# Patient Record
Sex: MALE | Race: WHITE | NOT HISPANIC OR LATINO | Employment: UNEMPLOYED | URBAN - METROPOLITAN AREA
[De-identification: names, ages, dates, MRNs, and addresses within clinical notes are randomized per-mention and may not be internally consistent; named-entity substitution may affect disease eponyms.]

---

## 2022-12-31 ENCOUNTER — HOSPITAL ENCOUNTER (INPATIENT)
Facility: HOSPITAL | Age: 57
LOS: 6 days | Discharge: HOME | DRG: 193 | End: 2023-01-06
Attending: EMERGENCY MEDICINE | Admitting: INTERNAL MEDICINE
Payer: MEDICARE

## 2022-12-31 ENCOUNTER — APPOINTMENT (OUTPATIENT)
Dept: GENERAL RADIOLOGY | Facility: HOSPITAL | Age: 57
DRG: 193 | End: 2022-12-31
Payer: MEDICARE

## 2022-12-31 DIAGNOSIS — J44.1 COPD EXACERBATION: ICD-10-CM

## 2022-12-31 DIAGNOSIS — J44.1 ACUTE EXACERBATION OF CHRONIC OBSTRUCTIVE PULMONARY DISEASE (COPD): Primary | ICD-10-CM

## 2022-12-31 DIAGNOSIS — R09.02 HYPOXIA: ICD-10-CM

## 2022-12-31 LAB
ALBUMIN SERPL-MCNC: 4 G/DL (ref 3.5–5.2)
ALBUMIN/GLOB SERPL: 1.3 G/DL
ALP SERPL-CCNC: 74 U/L (ref 39–117)
ALT SERPL W P-5'-P-CCNC: 15 U/L (ref 1–41)
ANION GAP SERPL CALCULATED.3IONS-SCNC: 7.2 MMOL/L (ref 5–15)
AST SERPL-CCNC: 17 U/L (ref 1–40)
BASE EXCESS BLDA CALC-SCNC: 3.3 MMOL/L (ref -2–2)
BASOPHILS # BLD AUTO: 0.12 10*3/MM3 (ref 0–0.2)
BASOPHILS NFR BLD AUTO: 1.3 % (ref 0–1.5)
BDY SITE: ABNORMAL
BILIRUB SERPL-MCNC: 0.5 MG/DL (ref 0–1.2)
BUN SERPL-MCNC: 9 MG/DL (ref 6–20)
BUN/CREAT SERPL: 10.6 (ref 7–25)
CALCIUM SPEC-SCNC: 9.3 MG/DL (ref 8.6–10.5)
CHLORIDE SERPL-SCNC: 98 MMOL/L (ref 98–107)
CO2 SERPL-SCNC: 30.8 MMOL/L (ref 22–29)
COHGB MFR BLD: 1.2 % (ref 0–1.5)
CREAT SERPL-MCNC: 0.85 MG/DL (ref 0.76–1.27)
DEPRECATED RDW RBC AUTO: 42.8 FL (ref 37–54)
EGFRCR SERPLBLD CKD-EPI 2021: 100.1 ML/MIN/1.73
EOSINOPHIL # BLD AUTO: 0.19 10*3/MM3 (ref 0–0.4)
EOSINOPHIL NFR BLD AUTO: 2 % (ref 0.3–6.2)
ERYTHROCYTE [DISTWIDTH] IN BLOOD BY AUTOMATED COUNT: 13.4 % (ref 12.3–15.4)
FHHB: 7.3 % (ref 0–5)
FLUAV AG NPH QL: NEGATIVE
FLUBV AG NPH QL IA: NEGATIVE
GAS FLOW AIRWAY: 1.5 LPM
GLOBULIN UR ELPH-MCNC: 3.1 GM/DL
GLUCOSE SERPL-MCNC: 101 MG/DL (ref 65–99)
HCO3 BLDA-SCNC: 30.2 MMOL/L (ref 22–26)
HCT VFR BLD AUTO: 48.2 % (ref 37.5–51)
HGB BLD-MCNC: 15.6 G/DL (ref 13–17.7)
HGB BLDA-MCNC: 16.2 G/DL (ref 13.8–16.4)
HOLD SPECIMEN: NORMAL
HOLD SPECIMEN: NORMAL
IMM GRANULOCYTES # BLD AUTO: 0.02 10*3/MM3 (ref 0–0.05)
IMM GRANULOCYTES NFR BLD AUTO: 0.2 % (ref 0–0.5)
LYMPHOCYTES # BLD AUTO: 0.98 10*3/MM3 (ref 0.7–3.1)
LYMPHOCYTES NFR BLD AUTO: 10.6 % (ref 19.6–45.3)
MCH RBC QN AUTO: 28.2 PG (ref 26.6–33)
MCHC RBC AUTO-ENTMCNC: 32.4 G/DL (ref 31.5–35.7)
MCV RBC AUTO: 87.2 FL (ref 79–97)
METHGB BLD QL: 0.3 % (ref 0–1.5)
MODALITY: ABNORMAL
MONOCYTES # BLD AUTO: 0.85 10*3/MM3 (ref 0.1–0.9)
MONOCYTES NFR BLD AUTO: 9.2 % (ref 5–12)
NEUTROPHILS NFR BLD AUTO: 7.11 10*3/MM3 (ref 1.7–7)
NEUTROPHILS NFR BLD AUTO: 76.7 % (ref 42.7–76)
NRBC BLD AUTO-RTO: 0 /100 WBC (ref 0–0.2)
NT-PROBNP SERPL-MCNC: 110.9 PG/ML (ref 0–900)
OXYHGB MFR BLDV: 91.2 % (ref 94–99)
PCO2 BLDA: 54.1 MM HG (ref 35–45)
PH BLDA: 7.36 PH UNITS (ref 7.35–7.45)
PLATELET # BLD AUTO: 190 10*3/MM3 (ref 140–450)
PMV BLD AUTO: 10.1 FL (ref 6–12)
PO2 BLDA: 65.3 MM HG (ref 80–100)
POTASSIUM SERPL-SCNC: 4.3 MMOL/L (ref 3.5–5.2)
PROT SERPL-MCNC: 7.1 G/DL (ref 6–8.5)
RBC # BLD AUTO: 5.53 10*6/MM3 (ref 4.14–5.8)
SAO2 % BLDCOA: 92.6 % (ref 95–99)
SARS-COV-2 RNA PNL SPEC NAA+PROBE: NOT DETECTED
SODIUM SERPL-SCNC: 136 MMOL/L (ref 136–145)
T4 FREE SERPL-MCNC: 0.95 NG/DL (ref 0.93–1.7)
TROPONIN T SERPL-MCNC: <0.01 NG/ML (ref 0–0.03)
TSH SERPL DL<=0.05 MIU/L-ACNC: 17.78 UIU/ML (ref 0.27–4.2)
WBC NRBC COR # BLD: 9.27 10*3/MM3 (ref 3.4–10.8)
WHOLE BLOOD HOLD COAG: NORMAL
WHOLE BLOOD HOLD SPECIMEN: NORMAL

## 2022-12-31 PROCEDURE — 94640 AIRWAY INHALATION TREATMENT: CPT

## 2022-12-31 PROCEDURE — 93005 ELECTROCARDIOGRAM TRACING: CPT

## 2022-12-31 PROCEDURE — 94799 UNLISTED PULMONARY SVC/PX: CPT

## 2022-12-31 PROCEDURE — 82375 ASSAY CARBOXYHB QUANT: CPT | Performed by: EMERGENCY MEDICINE

## 2022-12-31 PROCEDURE — 86738 MYCOPLASMA ANTIBODY: CPT | Performed by: INTERNAL MEDICINE

## 2022-12-31 PROCEDURE — 93010 ELECTROCARDIOGRAM REPORT: CPT | Performed by: SPECIALIST

## 2022-12-31 PROCEDURE — 36600 WITHDRAWAL OF ARTERIAL BLOOD: CPT | Performed by: EMERGENCY MEDICINE

## 2022-12-31 PROCEDURE — 87804 INFLUENZA ASSAY W/OPTIC: CPT | Performed by: EMERGENCY MEDICINE

## 2022-12-31 PROCEDURE — 83050 HGB METHEMOGLOBIN QUAN: CPT | Performed by: EMERGENCY MEDICINE

## 2022-12-31 PROCEDURE — 83880 ASSAY OF NATRIURETIC PEPTIDE: CPT | Performed by: EMERGENCY MEDICINE

## 2022-12-31 PROCEDURE — 84484 ASSAY OF TROPONIN QUANT: CPT | Performed by: EMERGENCY MEDICINE

## 2022-12-31 PROCEDURE — 93005 ELECTROCARDIOGRAM TRACING: CPT | Performed by: EMERGENCY MEDICINE

## 2022-12-31 PROCEDURE — 25010000002 METHYLPREDNISOLONE PER 125 MG: Performed by: EMERGENCY MEDICINE

## 2022-12-31 PROCEDURE — 71045 X-RAY EXAM CHEST 1 VIEW: CPT

## 2022-12-31 PROCEDURE — 99222 1ST HOSP IP/OBS MODERATE 55: CPT | Performed by: INTERNAL MEDICINE

## 2022-12-31 PROCEDURE — 99285 EMERGENCY DEPT VISIT HI MDM: CPT

## 2022-12-31 PROCEDURE — 86631 CHLAMYDIA ANTIBODY: CPT | Performed by: INTERNAL MEDICINE

## 2022-12-31 PROCEDURE — 84439 ASSAY OF FREE THYROXINE: CPT | Performed by: INTERNAL MEDICINE

## 2022-12-31 PROCEDURE — 25010000002 ENOXAPARIN PER 10 MG: Performed by: INTERNAL MEDICINE

## 2022-12-31 PROCEDURE — 80053 COMPREHEN METABOLIC PANEL: CPT | Performed by: EMERGENCY MEDICINE

## 2022-12-31 PROCEDURE — 86713 LEGIONELLA ANTIBODY: CPT | Performed by: INTERNAL MEDICINE

## 2022-12-31 PROCEDURE — 86603 ADENOVIRUS ANTIBODY: CPT | Performed by: INTERNAL MEDICINE

## 2022-12-31 PROCEDURE — 85025 COMPLETE CBC W/AUTO DIFF WBC: CPT | Performed by: EMERGENCY MEDICINE

## 2022-12-31 PROCEDURE — U0004 COV-19 TEST NON-CDC HGH THRU: HCPCS | Performed by: INTERNAL MEDICINE

## 2022-12-31 PROCEDURE — 84443 ASSAY THYROID STIM HORMONE: CPT | Performed by: INTERNAL MEDICINE

## 2022-12-31 PROCEDURE — 82805 BLOOD GASES W/O2 SATURATION: CPT | Performed by: EMERGENCY MEDICINE

## 2022-12-31 PROCEDURE — 94761 N-INVAS EAR/PLS OXIMETRY MLT: CPT

## 2022-12-31 RX ORDER — LEVOTHYROXINE SODIUM 0.1 MG/1
100 TABLET ORAL DAILY
COMMUNITY

## 2022-12-31 RX ORDER — BENZONATATE 100 MG/1
200 CAPSULE ORAL 3 TIMES DAILY PRN
Status: DISCONTINUED | OUTPATIENT
Start: 2022-12-31 | End: 2023-01-06 | Stop reason: HOSPADM

## 2022-12-31 RX ORDER — METHYLPREDNISOLONE SODIUM SUCCINATE 40 MG/ML
40 INJECTION, POWDER, LYOPHILIZED, FOR SOLUTION INTRAMUSCULAR; INTRAVENOUS 2 TIMES DAILY
Status: DISCONTINUED | OUTPATIENT
Start: 2022-12-31 | End: 2022-12-31

## 2022-12-31 RX ORDER — DIAZEPAM 5 MG/1
5 TABLET ORAL ONCE
Status: COMPLETED | OUTPATIENT
Start: 2022-12-31 | End: 2022-12-31

## 2022-12-31 RX ORDER — PANTOPRAZOLE SODIUM 40 MG/1
40 TABLET, DELAYED RELEASE ORAL
Status: DISCONTINUED | OUTPATIENT
Start: 2022-12-31 | End: 2023-01-06 | Stop reason: HOSPADM

## 2022-12-31 RX ORDER — LIDOCAINE HYDROCHLORIDE 20 MG/ML
15 SOLUTION OROPHARYNGEAL ONCE
Status: COMPLETED | OUTPATIENT
Start: 2022-12-31 | End: 2022-12-31

## 2022-12-31 RX ORDER — ONDANSETRON 2 MG/ML
4 INJECTION INTRAMUSCULAR; INTRAVENOUS EVERY 6 HOURS PRN
Status: DISCONTINUED | OUTPATIENT
Start: 2022-12-31 | End: 2023-01-06 | Stop reason: HOSPADM

## 2022-12-31 RX ORDER — DOXYCYCLINE 100 MG/1
100 CAPSULE ORAL EVERY 12 HOURS SCHEDULED
Status: COMPLETED | OUTPATIENT
Start: 2022-12-31 | End: 2023-01-05

## 2022-12-31 RX ORDER — FLUTICASONE FUROATE AND VILANTEROL 100; 25 UG/1; UG/1
1 POWDER RESPIRATORY (INHALATION)
COMMUNITY
End: 2023-01-06 | Stop reason: HOSPADM

## 2022-12-31 RX ORDER — METHYLPREDNISOLONE SODIUM SUCCINATE 40 MG/ML
40 INJECTION, POWDER, LYOPHILIZED, FOR SOLUTION INTRAMUSCULAR; INTRAVENOUS 2 TIMES DAILY
Status: DISCONTINUED | OUTPATIENT
Start: 2023-01-01 | End: 2023-01-01

## 2022-12-31 RX ORDER — CHOLECALCIFEROL (VITAMIN D3) 125 MCG
10 CAPSULE ORAL NIGHTLY
Status: DISCONTINUED | OUTPATIENT
Start: 2022-12-31 | End: 2023-01-06 | Stop reason: HOSPADM

## 2022-12-31 RX ORDER — ALUMINA, MAGNESIA, AND SIMETHICONE 2400; 2400; 240 MG/30ML; MG/30ML; MG/30ML
15 SUSPENSION ORAL ONCE
Status: COMPLETED | OUTPATIENT
Start: 2022-12-31 | End: 2022-12-31

## 2022-12-31 RX ORDER — ENOXAPARIN SODIUM 100 MG/ML
40 INJECTION SUBCUTANEOUS EVERY 24 HOURS
Status: DISCONTINUED | OUTPATIENT
Start: 2022-12-31 | End: 2023-01-06 | Stop reason: HOSPADM

## 2022-12-31 RX ORDER — ALBUTEROL SULFATE 2.5 MG/3ML
5 SOLUTION RESPIRATORY (INHALATION) ONCE
Status: COMPLETED | OUTPATIENT
Start: 2022-12-31 | End: 2022-12-31

## 2022-12-31 RX ORDER — LISINOPRIL 20 MG/1
20 TABLET ORAL DAILY
COMMUNITY

## 2022-12-31 RX ORDER — METHYLPREDNISOLONE SODIUM SUCCINATE 125 MG/2ML
125 INJECTION, POWDER, LYOPHILIZED, FOR SOLUTION INTRAMUSCULAR; INTRAVENOUS ONCE
Status: COMPLETED | OUTPATIENT
Start: 2022-12-31 | End: 2022-12-31

## 2022-12-31 RX ORDER — GUAIFENESIN 600 MG/1
1200 TABLET, EXTENDED RELEASE ORAL EVERY 12 HOURS SCHEDULED
Status: DISCONTINUED | OUTPATIENT
Start: 2022-12-31 | End: 2023-01-06 | Stop reason: HOSPADM

## 2022-12-31 RX ORDER — IPRATROPIUM BROMIDE AND ALBUTEROL SULFATE 2.5; .5 MG/3ML; MG/3ML
3 SOLUTION RESPIRATORY (INHALATION)
Status: DISCONTINUED | OUTPATIENT
Start: 2022-12-31 | End: 2023-01-02

## 2022-12-31 RX ORDER — ALBUTEROL SULFATE 2.5 MG/3ML
2.5 SOLUTION RESPIRATORY (INHALATION) EVERY 4 HOURS PRN
Status: DISCONTINUED | OUTPATIENT
Start: 2022-12-31 | End: 2023-01-06 | Stop reason: HOSPADM

## 2022-12-31 RX ORDER — SODIUM CHLORIDE 0.9 % (FLUSH) 0.9 %
10 SYRINGE (ML) INJECTION AS NEEDED
Status: DISCONTINUED | OUTPATIENT
Start: 2022-12-31 | End: 2023-01-06 | Stop reason: HOSPADM

## 2022-12-31 RX ORDER — IPRATROPIUM BROMIDE AND ALBUTEROL SULFATE 2.5; .5 MG/3ML; MG/3ML
3 SOLUTION RESPIRATORY (INHALATION) ONCE
Status: COMPLETED | OUTPATIENT
Start: 2022-12-31 | End: 2022-12-31

## 2022-12-31 RX ORDER — HYDROXYZINE HYDROCHLORIDE 25 MG/1
25 TABLET, FILM COATED ORAL EVERY 6 HOURS PRN
Status: DISCONTINUED | OUTPATIENT
Start: 2022-12-31 | End: 2023-01-06 | Stop reason: HOSPADM

## 2022-12-31 RX ADMIN — METHYLPREDNISOLONE SODIUM SUCCINATE 125 MG: 125 INJECTION, POWDER, FOR SOLUTION INTRAMUSCULAR; INTRAVENOUS at 14:40

## 2022-12-31 RX ADMIN — DIAZEPAM 5 MG: 5 TABLET ORAL at 18:16

## 2022-12-31 RX ADMIN — ENOXAPARIN SODIUM 40 MG: 100 INJECTION SUBCUTANEOUS at 19:09

## 2022-12-31 RX ADMIN — PANTOPRAZOLE SODIUM 40 MG: 40 TABLET, DELAYED RELEASE ORAL at 18:16

## 2022-12-31 RX ADMIN — Medication 10 MG: at 22:22

## 2022-12-31 RX ADMIN — GUAIFENESIN 1200 MG: 600 TABLET ORAL at 22:22

## 2022-12-31 RX ADMIN — ALUMINUM HYDROXIDE, MAGNESIUM HYDROXIDE, AND DIMETHICONE 15 ML: 400; 400; 40 SUSPENSION ORAL at 14:45

## 2022-12-31 RX ADMIN — DOXYCYCLINE 100 MG: 100 CAPSULE ORAL at 22:22

## 2022-12-31 RX ADMIN — ALBUTEROL SULFATE 5 MG: 2.5 SOLUTION RESPIRATORY (INHALATION) at 15:58

## 2022-12-31 RX ADMIN — IPRATROPIUM BROMIDE AND ALBUTEROL SULFATE 3 ML: .5; 3 SOLUTION RESPIRATORY (INHALATION) at 14:37

## 2022-12-31 RX ADMIN — LIDOCAINE HYDROCHLORIDE 15 ML: 20 SOLUTION ORAL at 14:45

## 2022-12-31 RX ADMIN — IPRATROPIUM BROMIDE AND ALBUTEROL SULFATE 3 ML: 2.5; .5 SOLUTION RESPIRATORY (INHALATION) at 20:38

## 2022-12-31 NOTE — H&P
[facility]  [facility]   Patient Name: Delvis Boston  : 1965  MRN: [id number]  Primary Care Physician:  Margie Garcia APRN  Date of admission: 2022    Subjective   Subjective     Chief Complaint: Short of breath    HPI:    Delvis Boston is a 57 y.o. male with past medical history difficult for COPD who presents emergency department with a 1 week history of shortness of breath and cough productive of a greenish sputum.  Denies any fever or chills.  Reports he has been using his nebulizer treatments as outpatient without any significant improvement in his symptoms.  Because of this he presented to the emergency department on today and the DVT the patient was found to be hypoxic with O2 sats of 87% on room air.  The patient does not currently use supplemental O2.  Received nebulizer therapy and IV Solu-Medrol.  Supplemental O2 was removed and patient continued to be hypoxic with O2 sats in the upper 80s and subsequently has been admitted to the hospital service for further evaluation and treatment.    Personal History     Past Medical History:  COPD  Hypothyroidism  Hypertension  Recurrent spontaneous pneumothoraces    Past Surgical History:  Right lung surgery    Family History:   Reviewed noncontributory    Social History:   Patient denies tobacco EtOH or illicit drug use     Home Medications:   Reviewed    Allergies:  Not on File    Review of Systems   All systems were reviewed and negative except for: As noted in HPI.  Patient also complains of burning epigastric pain and nausea.  He denies any chest pain.    Objective   Objective     Vitals:   Temp:  [97.8 °F (36.6 °C)] 97.8 °F (36.6 °C)  Heart Rate:  [84-94] 89  Resp:  [17-32] 20  BP: (128-148)/(63-99) 145/81  Flow (L/min):  [1.5-3] 3    Physical Exam    Constitutional: Awake, alert, no acute distress   Eyes: Pupils equal, sclerae anicteric, no conjunctival injection   HENT: NCAT, mucous  membranes moist   Neck: Supple, no thyromegaly, no lymphadenopathy, trachea midline   Respiratory: Diminished breath sounds in all lung fields very faint expiratory wheezes bilaterally, nonlabored respirations    Cardiovascular: RRR, no murmurs, palpable pedal pulses bilaterally   Gastrointestinal: Positive bowel sounds, soft, nontender, nondistended   Musculoskeletal: No bilateral ankle edema, no clubbing or cyanosis to extremities   Psychiatric: Appropriate affect, cooperative   Neurologic: Oriented x 3, in all extremities equally no focal weakness appreciated, Cranial Nerves grossly intact, speech clear   Skin: No rashes     Result Review    Result Review:  I have personally reviewed the results from the time of this admission to 12/31/2022 16:36 EST and agree with these findings:  [x]  Laboratory  []  Microbiology  [x]  Radiology  [x]  EKG/Telemetry   []  Cardiology/Vascular   []  Pathology  [x]  Old records  []  Other:      Assessment & Plan   Assessment / Plan     Assessment/Plan:   • Acute COPD exacerbation with acute bronchitis  • Acute hypoxic respiratory failure  • Hypothyroidism      Admit to the hospital service to telemetry monitor bed.  Continue nebulizer treatment with albuterol Atrovent nebs scheduled and albuterol nebs as needed.  Continue supplemental O2 to maintain sats greater equal to 89%.  Continue Solu-Medrol 40 mg IV every 12 hours.  Initiate doxycycline 100 mg p.o. twice daily.  Initiate mucolytic therapies and cough meds  Check respiratory panel  Check sputum culture  Patient does not follow with a pulmonologist currently as an outpatient, will consult RT  to make these arrangements.    DVT prophylaxis:  No DVT prophylaxis order currently exists.    CODE STATUS:    Code Status (Patient has no pulse and is not breathing): CPR (Attempt to Resuscitate)  Medical Interventions (Patient has pulse or is breathing): Full Support      Admission Status:  I believe this patient meets  inpatient status.    Electronically signed by Messi Painter MD, 12/31/22, 4:36 PM EST.

## 2022-12-31 NOTE — ED PROVIDER NOTES
"Time: 1:12 PM EST  Date of encounter:  12/31/2022  Independent Historian/Clinical History and Information was obtained by:   Patient  Chief Complaint: SOB    History is limited by: N/A    History of Present Illness:  Patient is a 57 y.o. year old male who presents to the emergency department for evaluation of SOB. Pt notes SOB is worsening over the last 5 days. Pt notes he has had a cough with discolored phlem for the past 5 days. Pt notes Hx of COPD. Pt denies smoking but reports dipping. Pt also states he has stomach pain where his ulcers are.         History provided by:  Patient   used: No        Patient Care Team  Primary Care Provider: Margie Garcia APRN    Past Medical History:     Not on File  No past medical history on file.  No past surgical history on file.  No family history on file.    Home Medications:  Prior to Admission medications    Not on File        Social History:          Review of Systems:  Review of Systems   Constitutional: Negative for chills and fever.   HENT: Negative for congestion, rhinorrhea and sore throat.    Eyes: Negative for pain and visual disturbance.   Respiratory: Positive for cough and shortness of breath. Negative for apnea and chest tightness.    Cardiovascular: Negative for chest pain and palpitations.   Gastrointestinal: Negative for abdominal pain, diarrhea, nausea and vomiting.   Genitourinary: Negative for difficulty urinating and dysuria.   Musculoskeletal: Negative for joint swelling and myalgias.   Skin: Negative for color change.   Neurological: Negative for seizures and headaches.   Psychiatric/Behavioral: Negative.    All other systems reviewed and are negative.       Physical Exam:  /81   Pulse 92   Temp 97.8 °F (36.6 °C) (Oral)   Resp 20   Ht 170.2 cm (67\")   Wt 90.3 kg (199 lb)   SpO2 92%   BMI 31.17 kg/m²     Physical Exam  Vitals and nursing note reviewed.   Constitutional:       General: He is not in acute distress.     " Appearance: Normal appearance. He is not toxic-appearing.   HENT:      Head: Normocephalic and atraumatic.      Jaw: There is normal jaw occlusion.   Eyes:      General: Lids are normal.      Extraocular Movements: Extraocular movements intact.      Conjunctiva/sclera: Conjunctivae normal.      Pupils: Pupils are equal, round, and reactive to light.   Cardiovascular:      Rate and Rhythm: Normal rate and regular rhythm.      Pulses: Normal pulses.      Heart sounds: Normal heart sounds.   Pulmonary:      Effort: Pulmonary effort is normal. Prolonged expiration present. No respiratory distress.      Breath sounds: Wheezing (All lung fields) present. No rhonchi.   Abdominal:      General: Abdomen is flat.      Palpations: Abdomen is soft.      Tenderness: There is no abdominal tenderness. There is no guarding or rebound.   Musculoskeletal:         General: Normal range of motion.      Cervical back: Normal range of motion and neck supple.      Right lower leg: No edema.      Left lower leg: No edema.   Skin:     General: Skin is warm and dry.   Neurological:      Mental Status: He is alert and oriented to person, place, and time. Mental status is at baseline.   Psychiatric:         Mood and Affect: Mood normal.                  Procedures:  Procedures      Medical Decision Making:      Comorbidities that affect care:    COPD    External Notes reviewed:    None      The following orders were placed and all results were independently analyzed by me:  Orders Placed This Encounter   Procedures   • Respiratory Culture - Sputum, Cough   • COVID PRE-OP / PRE-PROCEDURE SCREENING ORDER (NO ISOLATION) - Swab, Nasopharynx   • COVID-19,APTIMA PANTHER([place]),[facility], NP/OP SWAB IN UTM/VTM/SALINE TRANSPORT MEDIA,24 HR TAT - Swab, Nasopharynx   • Influenza Antigen, Rapid - Swab, Nasopharynx   • XR Chest 1 View   • [place] Draw   • Comprehensive Metabolic Panel   • BNP   • Troponin   • CBC Auto Differential   • Blood Gas,  Arterial -With Co-Ox Panel: Yes   • Basic Metabolic Panel   • CBC Auto Differential   • Magnesium   • Phosphorus   • Respiratory Infection Panel A   • NPO Diet NPO Type: Strict NPO   • Undress & Gown   • Cardiac Monitoring   • Continuous Pulse Oximetry   • Vital Signs   • Reason for COPD Admission: New Oxygen Requirements   • Tobacco Cessation Education   • Respiratory Treatment Education (MDI / Spacer / Nebulizer)   • COPD Education   • Cough / Deep Breathe   • Tobacco Cessation Education   • Code Status and Medical Interventions:   • Inpatient Hospitalist Consult   • Document Pulse Oximetry - On Room Air / Home O2 Level   • Incentive Spirometry   • Oxygen Therapy- Nasal Cannula; Titrate for SPO2: Greater Than or Equal To, 88%   • ECG 12 Lead ED Triage Standing Order; SOA   • Insert Peripheral IV   • Inpatient Admission   • CBC & Differential   • Green Top (Gel)   • Lavender Top   • Gold Top - SST   • Light Blue Top       Medications Given in the Emergency Department:  Medications   sodium chloride 0.9 % flush 10 mL (has no administration in time range)   albuterol (PROVENTIL) nebulizer solution 0.083% 2.5 mg/3mL (has no administration in time range)   ipratropium-albuterol (DUO-NEB) nebulizer solution 3 mL (has no administration in time range)   benzonatate (TESSALON) capsule 200 mg (has no administration in time range)   guaiFENesin (MUCINEX) 12 hr tablet 1,200 mg (has no administration in time range)   diazePAM (VALIUM) tablet 5 mg (has no administration in time range)   pantoprazole (PROTONIX) EC tablet 40 mg (has no administration in time range)   methylPREDNISolone sodium succinate (SOLU-Medrol) injection 40 mg (has no administration in time range)   doxycycline (MONODOX) capsule 100 mg (has no administration in time range)   ondansetron (ZOFRAN) injection 4 mg (has no administration in time range)   HYDROcod Polst-CPM Polst ER (TUSSIONEX PENNKINETIC) 10-8 MG/5ML ER suspension 5 mL (has no administration in  time range)   hydrOXYzine (ATARAX) tablet 25 mg (has no administration in time range)   melatonin tablet 10 mg (has no administration in time range)   methylPREDNISolone sodium succinate (SOLU-Medrol) injection 125 mg (125 mg Intravenous Given 12/31/22 1440)   aluminum-magnesium hydroxide-simethicone (MAALOX MAX) 400-400-40 MG/5ML suspension 15 mL (15 mL Oral Given 12/31/22 1445)   Lidocaine Viscous HCl (XYLOCAINE) 2 % solution 15 mL (15 mL Mouth/Throat Given 12/31/22 1445)   ipratropium-albuterol (DUO-NEB) nebulizer solution 3 mL (3 mL Nebulization Given 12/31/22 1437)   albuterol (PROVENTIL) nebulizer solution 0.083% 2.5 mg/3mL (5 mg Nebulization Given 12/31/22 1558)        ED Course:         Labs:    Lab Results (last 24 hours)     Procedure Component Value Units Date/Time    CBC & Differential [[id number]]  (Abnormal) Collected: 12/31/22 1235    Specimen: Blood Updated: 12/31/22 1243    Narrative:      The following orders were created for panel order CBC & Differential.  Procedure                               Abnormality         Status                     ---------                               -----------         ------                     CBC Auto Differential[[id number]]        Abnormal            Final result                 Please view results for these tests on the individual orders.    Comprehensive Metabolic Panel [[id number]]  (Abnormal) Collected: 12/31/22 1235    Specimen: Blood Updated: 12/31/22 1301     Glucose 101 mg/dL      BUN 9 mg/dL      Creatinine 0.85 mg/dL      Sodium 136 mmol/L      Potassium 4.3 mmol/L      Chloride 98 mmol/L      CO2 30.8 mmol/L      Calcium 9.3 mg/dL      Total Protein 7.1 g/dL      Albumin 4.0 g/dL      ALT (SGPT) 15 U/L      AST (SGOT) 17 U/L      Alkaline Phosphatase 74 U/L      Total Bilirubin 0.5 mg/dL      Globulin 3.1 gm/dL      A/G Ratio 1.3 g/dL      BUN/Creatinine Ratio 10.6     Anion Gap 7.2 mmol/L      eGFR 100.1 mL/min/1.73      Comment: National Kidney  Foundation and American Society of Nephrology (ASN) Task Force recommended calculation based on the Chronic Kidney Disease Epidemiology Collaboration (CKD-EPI) equation refit without adjustment for race.       Narrative:      GFR Normal >60  Chronic Kidney Disease <60  Kidney Failure <15      BNP [[id number]]  (Normal) Collected: 12/31/22 1235    Specimen: Blood Updated: 12/31/22 1259     proBNP 110.9 pg/mL     Narrative:      Among patients with dyspnea, NT-proBNP is highly sensitive for the detection of acute congestive heart failure. In addition NT-proBNP of <300 pg/ml effectively rules out acute congestive heart failure with 99% negative predictive value.      Troponin [[id number]]  (Normal) Collected: 12/31/22 1235    Specimen: Blood Updated: 12/31/22 1301     Troponin T <0.010 ng/mL     Narrative:      Troponin T Reference Range:  <= 0.03 ng/mL-   Negative for AMI  >0.03 ng/mL-     Abnormal for myocardial necrosis.  Clinicians would have to utilize clinical acumen, EKG, Troponin and serial changes to determine if it is an Acute Myocardial Infarction or myocardial injury due to an underlying chronic condition.       Results may be falsely decreased if patient taking Biotin.      CBC Auto Differential [[id number]]  (Abnormal) Collected: 12/31/22 1235    Specimen: Blood Updated: 12/31/22 1243     WBC 9.27 10*3/mm3      RBC 5.53 10*6/mm3      Hemoglobin 15.6 g/dL      Hematocrit 48.2 %      MCV 87.2 fL      MCH 28.2 pg      MCHC 32.4 g/dL      RDW 13.4 %      RDW-SD 42.8 fl      MPV 10.1 fL      Platelets 190 10*3/mm3      Neutrophil % 76.7 %      Lymphocyte % 10.6 %      Monocyte % 9.2 %      Eosinophil % 2.0 %      Basophil % 1.3 %      Immature Grans % 0.2 %      Neutrophils, Absolute 7.11 10*3/mm3      Lymphocytes, Absolute 0.98 10*3/mm3      Monocytes, Absolute 0.85 10*3/mm3      Eosinophils, Absolute 0.19 10*3/mm3      Basophils, Absolute 0.12 10*3/mm3      Immature Grans, Absolute 0.02 10*3/mm3      nRBC 0.0  /100 WBC     Respiratory Infection Panel A [[id number]] Collected: 12/31/22 1235    Specimen: Blood Updated: 12/31/22 1638    Blood Gas, Arterial -With Co-Ox Panel: Yes [[id number]]  (Abnormal) Collected: 12/31/22 1438    Specimen: Arterial Blood Updated: 12/31/22 1500     pH, Arterial 7.364 pH units      pCO2, Arterial 54.1 mm Hg      pO2, Arterial 65.3 mm Hg      HCO3, Arterial 30.2 mmol/L      Base Excess, Arterial 3.3 mmol/L      O2 Saturation, Arterial 92.6 %      Hemoglobin, Blood Gas 16.2 g/dL      Carboxyhemoglobin 1.2 %      Methemoglobin 0.30 %      Oxyhemoglobin 91.2 %      FHHB 7.3 %      Site Arterial: right brachial     Modality Cannula     Flow Rate 1.5 lpm     COVID PRE-OP / PRE-PROCEDURE SCREENING ORDER (NO ISOLATION) - Swab, Nasopharynx [[id number]] Collected: 12/31/22 1733    Specimen: Swab from Nasopharynx Updated: 12/31/22 1739    Narrative:      The following orders were created for panel order COVID PRE-OP / PRE-PROCEDURE SCREENING ORDER (NO ISOLATION) - Swab, Nasopharynx.  Procedure                               Abnormality         Status                     ---------                               -----------         ------                     COVID-19,APTIMA PANTHER(...[[id number]]                      In process                   Please view results for these tests on the individual orders.    COVID-19,APTIMA PANTHER([place]), [facility]/ [facility], NP/OP SWAB IN UTM/VTM/SALINE TRANSPORT MEDIA,24 HR TAT - Swab, Nasopharynx [[id number]] Collected: 12/31/22 1733    Specimen: Swab from Nasopharynx Updated: 12/31/22 1739    Influenza Antigen, Rapid - Swab, Nasopharynx [[id number]] Collected: 12/31/22 1733    Specimen: Swab from Nasopharynx Updated: 12/31/22 1741           Imaging:    XR Chest 1 View    Result Date: 12/31/2022  PROCEDURE: XR CHEST 1 VW  COMPARISON: [facility], CR, CHEST PA/AP & LAT 2V, 9/18/2015, 11:54.  INDICATIONS: SOA Triage Protocol  FINDINGS:  Severe emphysematous changes are  noted.  Postoperative changes are noted in the mid to upper right lung field.  Coarse interstitial markings in the mid and lower lung fields bilaterally are suspected to represent chronic interstitial lung disease.  No acute infiltrate or effusion is seen.  The cardiac and mediastinal silhouettes appear normal.        1. A severe emphysematous changes 2. Probable scarring/fibrosis in the mid and lower lung fields bilaterally. 3. No acute consolidation identified.       Alok Humphrey M.D.       Electronically Signed and Approved By: Alok Humphrey M.D. on 12/31/2022 at 13:14                 Differential Diagnosis and Discussion:    Dyspnea: Differential diagnosis includes but is not limited to metabolic acidosis, neurological disorders, psychogenic, asthma, pneumothorax, upper airway obstruction, COPD, pneumonia, noncardiogenic pulmonary edema, interstitial lung disease, anemia, congestive heart failure, and pulmonary embolism    All labs were reviewed and analyzed by me.  All X-rays were independently reviewed by me.    MDM  Number of Diagnoses or Management Options  Acute exacerbation of chronic obstructive pulmonary disease (COPD) (HCC)  Diagnosis management comments: In summary this is a 57-year-old male with a history of COPD that is normally not oxygen requiring who presents to the emergency department with complaints of shortness of breath.  On arrival he is mildly hypoxic and did require nasal cannula oxygen supplementation.  On examination he was wheezing significantly with a prolonged expiratory phase and minimal air movement as a result.  Chest x-ray unremarkable.  CBC independently reviewed by me and shows no critical abnormalities.  CMP independently reviewed by me and shows no critical abnormalities.  Patient was given Solu-Medrol, breathing treatments with minimal improvement.  Attempted to wean him off of the nasal cannula oxygen however became hypoxic at rest as low as 87% when doing so.  He was  therefore placed back on nasal cannula oxygen.  Patient will be admitted to the hospital for further treatment of since acute exacerbation of COPD.  ABG was performed and does not reveal hypercapnia but does show hypoxia.  Patient case has been discussed with the hospitalist team who will admit to the hospital for further evaluation and continuation of treatment.    Patient Progress  Patient progress: stable (Pt was informed of all test results and all questions answered)           Patient Care Considerations:    I considered ordering a CT scan of the chest, however Chest x-ray was clear      Consultants/Shared Management Plan:    Hospitalist: I have discussed the case with Admitting physician who agrees to accept the patient for admission.    Social Determinants of Health:    Patient is independent, reliable, and has access to care.       Disposition and Care Coordination:    Admit:   Through independent evaluation of the patient's history, physical, and imperical data, the patient meets criteria for observation/admission to the hospital.        Final diagnoses:   Acute exacerbation of chronic obstructive pulmonary disease (COPD) (HCC)   Hypoxia        ED Disposition     ED Disposition   Decision to Admit    Condition   --    Comment   Level of Care: Remote Telemetry [26]   Diagnosis: Acute exacerbation of chronic obstructive pulmonary disease (COPD) (HCC) [541762]   Certification: I Certify That Inpatient Hospital Services Are Medically Necessary For Greater Than 2 Midnights               This medical record created using voice recognition software.      Documentation assistance provided by Andrea leavitt, acting as scribe for Marshall Hutton MD. Information recorded by the scribe was done at my direction and has been verified and validated by me.     Andrea Leavitt  12/31/22 1340       Andrea Leavitt  12/31/22 [id number]       Marshall Hutton MD  12/31/22 2541

## 2023-01-01 ENCOUNTER — APPOINTMENT (OUTPATIENT)
Dept: CT IMAGING | Facility: HOSPITAL | Age: 58
DRG: 193 | End: 2023-01-01
Payer: MEDICARE

## 2023-01-01 LAB
ANION GAP SERPL CALCULATED.3IONS-SCNC: 11 MMOL/L (ref 5–15)
BASOPHILS # BLD AUTO: 0.01 10*3/MM3 (ref 0–0.2)
BASOPHILS NFR BLD AUTO: 0.1 % (ref 0–1.5)
BUN SERPL-MCNC: 11 MG/DL (ref 6–20)
BUN/CREAT SERPL: 13.3 (ref 7–25)
CALCIUM SPEC-SCNC: 9.6 MG/DL (ref 8.6–10.5)
CHLORIDE SERPL-SCNC: 98 MMOL/L (ref 98–107)
CO2 SERPL-SCNC: 27 MMOL/L (ref 22–29)
CREAT SERPL-MCNC: 0.83 MG/DL (ref 0.76–1.27)
DEPRECATED RDW RBC AUTO: 43.3 FL (ref 37–54)
EGFRCR SERPLBLD CKD-EPI 2021: 100.8 ML/MIN/1.73
EOSINOPHIL # BLD AUTO: 0 10*3/MM3 (ref 0–0.4)
EOSINOPHIL NFR BLD AUTO: 0 % (ref 0.3–6.2)
ERYTHROCYTE [DISTWIDTH] IN BLOOD BY AUTOMATED COUNT: 13.2 % (ref 12.3–15.4)
GLUCOSE SERPL-MCNC: 184 MG/DL (ref 65–99)
HCT VFR BLD AUTO: 50.3 % (ref 37.5–51)
HGB BLD-MCNC: 16.1 G/DL (ref 13–17.7)
IMM GRANULOCYTES # BLD AUTO: 0.05 10*3/MM3 (ref 0–0.05)
IMM GRANULOCYTES NFR BLD AUTO: 0.6 % (ref 0–0.5)
L PNEUMO1 AG UR QL IA: NEGATIVE
LYMPHOCYTES # BLD AUTO: 0.7 10*3/MM3 (ref 0.7–3.1)
LYMPHOCYTES NFR BLD AUTO: 8.6 % (ref 19.6–45.3)
M PNEUMO IGM SER QL: NEGATIVE
MAGNESIUM SERPL-MCNC: 2.3 MG/DL (ref 1.6–2.6)
MCH RBC QN AUTO: 28.6 PG (ref 26.6–33)
MCHC RBC AUTO-ENTMCNC: 32 G/DL (ref 31.5–35.7)
MCV RBC AUTO: 89.5 FL (ref 79–97)
MONOCYTES # BLD AUTO: 0.18 10*3/MM3 (ref 0.1–0.9)
MONOCYTES NFR BLD AUTO: 2.2 % (ref 5–12)
NEUTROPHILS NFR BLD AUTO: 7.19 10*3/MM3 (ref 1.7–7)
NEUTROPHILS NFR BLD AUTO: 88.5 % (ref 42.7–76)
NRBC BLD AUTO-RTO: 0 /100 WBC (ref 0–0.2)
PHOSPHATE SERPL-MCNC: 3.8 MG/DL (ref 2.5–4.5)
PLATELET # BLD AUTO: 191 10*3/MM3 (ref 140–450)
PMV BLD AUTO: 10 FL (ref 6–12)
POTASSIUM SERPL-SCNC: 5.2 MMOL/L (ref 3.5–5.2)
QT INTERVAL: 373 MS
RBC # BLD AUTO: 5.62 10*6/MM3 (ref 4.14–5.8)
S PNEUM AG SPEC QL LA: NEGATIVE
SODIUM SERPL-SCNC: 136 MMOL/L (ref 136–145)
WBC NRBC COR # BLD: 8.13 10*3/MM3 (ref 3.4–10.8)

## 2023-01-01 PROCEDURE — 99223 1ST HOSP IP/OBS HIGH 75: CPT | Performed by: INTERNAL MEDICINE

## 2023-01-01 PROCEDURE — 80048 BASIC METABOLIC PNL TOTAL CA: CPT | Performed by: INTERNAL MEDICINE

## 2023-01-01 PROCEDURE — 84100 ASSAY OF PHOSPHORUS: CPT | Performed by: INTERNAL MEDICINE

## 2023-01-01 PROCEDURE — 94799 UNLISTED PULMONARY SVC/PX: CPT

## 2023-01-01 PROCEDURE — 25010000002 METHYLPREDNISOLONE PER 40 MG: Performed by: INTERNAL MEDICINE

## 2023-01-01 PROCEDURE — 83735 ASSAY OF MAGNESIUM: CPT | Performed by: INTERNAL MEDICINE

## 2023-01-01 PROCEDURE — 87185 SC STD ENZYME DETCJ PER NZM: CPT | Performed by: FAMILY MEDICINE

## 2023-01-01 PROCEDURE — 87070 CULTURE OTHR SPECIMN AEROBIC: CPT | Performed by: FAMILY MEDICINE

## 2023-01-01 PROCEDURE — 87449 NOS EACH ORGANISM AG IA: CPT | Performed by: FAMILY MEDICINE

## 2023-01-01 PROCEDURE — 71250 CT THORAX DX C-: CPT

## 2023-01-01 PROCEDURE — 87077 CULTURE AEROBIC IDENTIFY: CPT | Performed by: FAMILY MEDICINE

## 2023-01-01 PROCEDURE — 25010000002 METHYLPREDNISOLONE PER 40 MG: Performed by: FAMILY MEDICINE

## 2023-01-01 PROCEDURE — 99233 SBSQ HOSP IP/OBS HIGH 50: CPT | Performed by: FAMILY MEDICINE

## 2023-01-01 PROCEDURE — 87205 SMEAR GRAM STAIN: CPT | Performed by: FAMILY MEDICINE

## 2023-01-01 PROCEDURE — 85025 COMPLETE CBC W/AUTO DIFF WBC: CPT | Performed by: INTERNAL MEDICINE

## 2023-01-01 PROCEDURE — 86738 MYCOPLASMA ANTIBODY: CPT | Performed by: FAMILY MEDICINE

## 2023-01-01 PROCEDURE — 87899 AGENT NOS ASSAY W/OPTIC: CPT | Performed by: FAMILY MEDICINE

## 2023-01-01 PROCEDURE — 25010000002 ENOXAPARIN PER 10 MG: Performed by: INTERNAL MEDICINE

## 2023-01-01 PROCEDURE — 36415 COLL VENOUS BLD VENIPUNCTURE: CPT | Performed by: INTERNAL MEDICINE

## 2023-01-01 RX ORDER — BUDESONIDE 0.5 MG/2ML
0.5 INHALANT ORAL
Status: DISCONTINUED | OUTPATIENT
Start: 2023-01-01 | End: 2023-01-06 | Stop reason: HOSPADM

## 2023-01-01 RX ORDER — ALPRAZOLAM 0.25 MG/1
0.5 TABLET ORAL ONCE AS NEEDED
Status: COMPLETED | OUTPATIENT
Start: 2023-01-01 | End: 2023-01-01

## 2023-01-01 RX ORDER — LEVOTHYROXINE SODIUM 0.1 MG/1
100 TABLET ORAL
Status: DISCONTINUED | OUTPATIENT
Start: 2023-01-01 | End: 2023-01-06 | Stop reason: HOSPADM

## 2023-01-01 RX ORDER — METHYLPREDNISOLONE SODIUM SUCCINATE 40 MG/ML
40 INJECTION, POWDER, LYOPHILIZED, FOR SOLUTION INTRAMUSCULAR; INTRAVENOUS EVERY 8 HOURS
Status: DISCONTINUED | OUTPATIENT
Start: 2023-01-01 | End: 2023-01-05

## 2023-01-01 RX ORDER — ARFORMOTEROL TARTRATE 15 UG/2ML
15 SOLUTION RESPIRATORY (INHALATION)
Status: DISCONTINUED | OUTPATIENT
Start: 2023-01-01 | End: 2023-01-06 | Stop reason: HOSPADM

## 2023-01-01 RX ORDER — LISINOPRIL 20 MG/1
20 TABLET ORAL DAILY
Status: DISCONTINUED | OUTPATIENT
Start: 2023-01-01 | End: 2023-01-06 | Stop reason: HOSPADM

## 2023-01-01 RX ADMIN — ALPRAZOLAM 0.5 MG: 0.25 TABLET ORAL at 20:33

## 2023-01-01 RX ADMIN — HYDROXYZINE HYDROCHLORIDE 25 MG: 25 TABLET, FILM COATED ORAL at 12:32

## 2023-01-01 RX ADMIN — IPRATROPIUM BROMIDE AND ALBUTEROL SULFATE 3 ML: 2.5; .5 SOLUTION RESPIRATORY (INHALATION) at 07:16

## 2023-01-01 RX ADMIN — LEVOTHYROXINE SODIUM 100 MCG: 0.1 TABLET ORAL at 09:46

## 2023-01-01 RX ADMIN — PANTOPRAZOLE SODIUM 40 MG: 40 TABLET, DELAYED RELEASE ORAL at 06:31

## 2023-01-01 RX ADMIN — IPRATROPIUM BROMIDE AND ALBUTEROL SULFATE 3 ML: 2.5; .5 SOLUTION RESPIRATORY (INHALATION) at 01:38

## 2023-01-01 RX ADMIN — IPRATROPIUM BROMIDE AND ALBUTEROL SULFATE 3 ML: 2.5; .5 SOLUTION RESPIRATORY (INHALATION) at 12:03

## 2023-01-01 RX ADMIN — Medication 10 MG: at 20:34

## 2023-01-01 RX ADMIN — BUDESONIDE 0.5 MG: 0.5 INHALANT ORAL at 18:55

## 2023-01-01 RX ADMIN — HYDROXYZINE HYDROCHLORIDE 25 MG: 25 TABLET, FILM COATED ORAL at 18:14

## 2023-01-01 RX ADMIN — ARFORMOTEROL TARTRATE 15 MCG: 15 SOLUTION RESPIRATORY (INHALATION) at 18:55

## 2023-01-01 RX ADMIN — ENOXAPARIN SODIUM 40 MG: 100 INJECTION SUBCUTANEOUS at 18:15

## 2023-01-01 RX ADMIN — ARFORMOTEROL TARTRATE 15 MCG: 15 SOLUTION RESPIRATORY (INHALATION) at 08:34

## 2023-01-01 RX ADMIN — BUDESONIDE 0.5 MG: 0.5 INHALANT ORAL at 08:34

## 2023-01-01 RX ADMIN — GUAIFENESIN 1200 MG: 600 TABLET ORAL at 20:33

## 2023-01-01 RX ADMIN — DOXYCYCLINE 100 MG: 100 CAPSULE ORAL at 09:46

## 2023-01-01 RX ADMIN — LISINOPRIL 20 MG: 20 TABLET ORAL at 09:47

## 2023-01-01 RX ADMIN — METHYLPREDNISOLONE SODIUM SUCCINATE 40 MG: 40 INJECTION, POWDER, FOR SOLUTION INTRAMUSCULAR; INTRAVENOUS at 02:21

## 2023-01-01 RX ADMIN — DOXYCYCLINE 100 MG: 100 CAPSULE ORAL at 20:33

## 2023-01-01 RX ADMIN — METHYLPREDNISOLONE SODIUM SUCCINATE 40 MG: 40 INJECTION, POWDER, FOR SOLUTION INTRAMUSCULAR; INTRAVENOUS at 18:15

## 2023-01-01 RX ADMIN — GUAIFENESIN 1200 MG: 600 TABLET ORAL at 09:46

## 2023-01-01 RX ADMIN — METHYLPREDNISOLONE SODIUM SUCCINATE 40 MG: 40 INJECTION, POWDER, FOR SOLUTION INTRAMUSCULAR; INTRAVENOUS at 09:46

## 2023-01-01 NOTE — PLAN OF CARE
Goal Outcome Evaluation:  Plan of Care Reviewed With: patient        Progress: improving  Outcome Evaluation: patient resting in bed, no c/o pain or discomfort, patient on 3L NC, call light within reach.

## 2023-01-01 NOTE — PROGRESS NOTES
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    Interval follow-up: Patient seen and examined this morning, no acute distress, no acute major night events, prior to seeing the patient I ordered a CT scan of his chest and scheduled nebs as well as steroids, he continues to have diminished breath sounds and dyspnea on exertion.  Denies chest pain or palpitations but does have some chest discomfort with coughing.  Difficulty clearing sputum.  On 3 L nasal cannula.  I consulted pulmonary given the severity of his COPD in addition to after rounds finding a 1.5 cm mass in the left lower lobe on CT scan I pursued this morning.  Telemetry reviewed intermittently tachycardic, few bursts of PVCs, baseline sinus rhythm in the 90s.  Glucose 184, on steroids.  CBC 8.1, elevated neutrophils.  COVID-negative, flu negative, Legionella negative, mycoplasma negative, strep negative    Review of systems:  All systems reviewed and negative except for cough, shortness of breath, lower extremity edema, generalized fatigue, generalized weakness    Objective   Objective     Vitals:   Temp:  [97.5 °F (36.4  °C)-98.6 °F (37 °C)] 98.2 °F (36.8 °C)  Heart Rate:  [70-96] 90  Resp:  [17-32] 18  BP: (117-154)/(54-99) 141/79  Flow (L/min):  [1.5-3] 3  Physical Exam    Constitutional: Awake, alert, no acute distress sitting upright at the edge of the bed leaning over the bedside table, on 2 L nasal cannula   Eyes: Pupils equal, sclerae anicteric, no conjunctival injection   HENT: NCAT, mucous membranes moist   Neck: Supple, no thyromegaly, no lymphadenopathy, trachea midline   Respiratory: Significantly diminished breath sounds throughout with diffuse wheezing   Cardiovascular: RRR, no murmurs, rubs, or gallops, palpable pedal pulses bilaterally   Gastrointestinal: Positive bowel sounds, soft, nontender, nondistended   Musculoskeletal: Trace bilateral ankle edema, no clubbing or cyanosis to extremities   Psychiatric: Appropriate affect, cooperative   Neurologic: Oriented x 3, strength symmetric in all extremities, Cranial Nerves grossly intact to confrontation, speech clear   Skin: No rashes visible on exposed skin      Result Review    Result Review:  I have personally reviewed the pertinent results from the past 24 hours to 1/1/2023 10:56 EST and agree with these findings:  [x]  Laboratory   CBC    CBC 12/31/22 1/1/23   WBC 9.27 8.13   RBC 5.53 5.62   Hemoglobin 15.6 16.1   Hematocrit 48.2 50.3   MCV 87.2 89.5   MCH 28.2 28.6   MCHC 32.4 32.0   RDW 13.4 13.2   Platelets 190 191           BMP    BMP 12/31/22 1/1/23   BUN 9 11   Creatinine 0.85 0.83   Sodium 136 136   Potassium 4.3 5.2   Chloride 98 98   CO2 30.8 (A) 27.0   Calcium 9.3 9.6   (A) Abnormal value       Comments are available for some flowsheets but are not being displayed.           LIVER FUNCTION TESTS:      Lab 12/31/22  1235   TOTAL PROTEIN 7.1   ALBUMIN 4.0   GLOBULIN 3.1   ALT (SGPT) 15   AST (SGOT) 17   BILIRUBIN 0.5   ALK PHOS 74       [x]  Microbiology No results found for: ACANTHNAEG, AFBCX, BPERTUSSISCX, BLOODCX  No results found for: BCIDPCR, CXREFLEX,  CSFCX, CULTURETIS  No results found for: CULTURES, HSVCX, URCX  No results found for: EYECULTURE, GCCX, HSVCULTURE, LABHSV  No results found for: LEGIONELLA, MRSACX, MUMPSCX, MYCOPLASCX  No results found for: NOCARDIACX, STOOLCX  No results found for: THROATCX, UNSTIMCULT, URINECX, CULTURE, VZVCULTUR  No results found for: VIRALCULTU, WOUNDCX    [x]  Radiology CT Chest Without Contrast Diagnostic    Result Date: 1/1/2023  PROCEDURE: CT CHEST WO CONTRAST DIAGNOSTIC  COMPARISON: [facility], CR, XR CHEST 1 VW, 12/31/2022, 12:45.  [facility], CT, CHEST W/O CONTRAST, 10/08/2014, 12:36.  [facility], CT, ABDOMEN/PELVIS WITH CONTRAST, 9/18/2015, 13:31.  INDICATIONS: Respiratory illness, nondiagnostic xray  TECHNIQUE: CT images were created without the administration of contrast material.   PROTOCOL:   Standard imaging protocol performed    RADIATION:   DLP: 359.7 mGy*cm   Automated exposure control was utilized to minimize radiation dose.  FINDINGS:  Lung window images reveal marked emphysema.  Pleural thickening in the right hemithorax with calcification appear stable.  Scarring in the right lower lobe is unchanged.  1.5 cm ill-defined noncalcified nodule is seen in the left lower lobe, well seen on series 202, image 99. This is suspicious for bronchogenic malignancy or lung cancer, a small bronchus is seen centrally.  Mediastinal windows reveal no mediastinal or axillary adenopathy.  Extensive coronary artery calcifications are evident.  Mild degenerative spurring is seen in the thoracic spine.         CT scan of the chest with IV contrast demonstrating marked emphysema.  1.5 cm mass in the left lower lobe is suspicious for bronchogenic malignancy or lung cancer.     DANETTE KINCAID MD       Electronically Signed and Approved By: DANETTE KINCAID MD on 1/01/2023 at 9:42             XR Chest 1 View    Result Date: 12/31/2022  PROCEDURE: XR CHEST 1 VW  COMPARISON: [facility]  [facility], , CHEST PA/AP & LAT 2V, 9/18/2015, 11:54.  INDICATIONS: SOA Triage Protocol  FINDINGS:  Severe emphysematous changes are noted.  Postoperative changes are noted in the mid to upper right lung field.  Coarse interstitial markings in the mid and lower lung fields bilaterally are suspected to represent chronic interstitial lung disease.  No acute infiltrate or effusion is seen.  The cardiac and mediastinal silhouettes appear normal.        1. A severe emphysematous changes 2. Probable scarring/fibrosis in the mid and lower lung fields bilaterally. 3. No acute consolidation identified.       Alok Humphrey M.D.       Electronically Signed and Approved By: Alok Humphrey M.D. on 12/31/2022 at 13:14               [x]  EKG/Telemetry   []  Cardiology/Vascular   []  Pathology  [x]  Old records  []  Other:    Assessment & Plan   Assessment / Plan     Assessment/Plan:  Assessment:  COPD with acute exacerbation  Acute hypoxemic respiratory failure  1.5 cm mass in the left lower lobe suspicious of bronchogenic malignancy/cancer  Ex-smoker; heavy tobacco use in the past  Work-related environmental exposures of the lung  Hypothyroidism  Essential hypertension    Plan:  Labs and imaging reviewed  Start Brovana Pulmicort nebs twice daily  Start Solu-Medrol 40 mg IV every 8 hours  Continue doxycycline 100 mg twice daily  Continue DuoNebs every 6 hours  Bronchopulmonary hygiene protocol Nexium bronchodilator protocol  Supplement oxygen to maintain sats greater 92%  CT scan of the chest reviewed  Pulmonary consulted discussed with Dr. Green, follow-up recommendations; will likely need bronchoscopy  Follow-up sputum culture  Continue telemetry monitoring  A.m. labs  Full code  DVT prophylaxis with Lovenox  Monitoring blood counts while on Lovenox  Clinical course to dictate further management  Discussed with nurse at the bedside    DVT prophylaxis:  Medical DVT prophylaxis orders are present.    CODE STATUS:   Code Status  (Patient has no pulse and is not breathing): CPR (Attempt to Resuscitate)  Medical Interventions (Patient has pulse or is breathing): Full Support        Electronically signed by Liam Ramos MD, 01/01/23, 10:56 AM EST.    Portions of this documentation were transcribed electronically from a voice recognition software.  I confirm all data accurately represents the service(s) I performed at today's visit.

## 2023-01-01 NOTE — PAYOR COMM NOTE
"Delvis Boston (57 y.o. Male)     Date of Birth   1965    Social Security Number       Address   [address] [address]    Home Phone   [phone]    MRN   [id number]       [facility]    Marital Status                               Admission Date   22    Admission Type   Emergency    Admitting Provider   Liam Ramos MD    Attending Provider   Liam Ramos MD    Department, Room/Bed   [address], 3016/1       Discharge Date       Discharge Disposition       Discharge Destination                               Attending Provider: Liam Ramos MD    Allergies: Not on File    Isolation: None   Infection: None   Code Status: CPR    Ht: 170.2 cm (67.01\")   Wt: 86.4 kg (190 lb 7.6 oz)    Admission Cmt: None   Principal Problem: Acute exacerbation of chronic obstructive pulmonary disease (COPD) (HCC) [J44.1]                 Active Insurance as of 2022     Primary Coverage     Payor Plan Insurance Group Employer/Plan Group    [facility] MEDICARE REPLACEMENT [facility] MEDICARE ADVANTAGE [id number]     Payor Plan Address Payor Plan Phone Number Payor Plan Fax Number Effective Dates    [address] [phone]  2022 - None Entered    [facility] [id number]       Subscriber Name Subscriber Birth Date Member ID       DELVIS BOSTON 1965 [id number]                 Emergency Contacts      (Rel.) Home Phone Work Phone Mobile Phone    Sneha Boston (Spouse) [phone] -- --        AVAILITY# [id number] [P]-FAXED CLINICALS    CONTACT   [employer]ATION REVIEW    [facility]  [address] [place] [address]  TAX ID [id number]  NPI  [id number]       [phone]   [phone]       History & Physical      Messi Painter MD at 22 [address] [place] [address] HISTORY AND PHYSICAL  Date: 2022   Patient Name: Delvis Boston  : 1965  MRN: " [id number]  Primary Care Physician:  Margie Garcia APRN  Date of admission: 12/31/2022    Subjective    Subjective     Chief Complaint: Short of breath    HPI:    Delvis Boston is a 57 y.o. male with past medical history difficult for COPD who presents emergency department with a 1 week history of shortness of breath and cough productive of a greenish sputum.  Denies any fever or chills.  Reports he has been using his nebulizer treatments as outpatient without any significant improvement in his symptoms.  Because of this he presented to the emergency department on today and the DVT the patient was found to be hypoxic with O2 sats of 87% on room air.  The patient does not currently use supplemental O2.  Received nebulizer therapy and IV Solu-Medrol.  Supplemental O2 was removed and patient continued to be hypoxic with O2 sats in the upper 80s and subsequently has been admitted to the hospital service for further evaluation and treatment.    Personal History     Past Medical History:  COPD  Hypothyroidism  Hypertension  Recurrent spontaneous pneumothoraces    Past Surgical History:  Right lung surgery    Family History:   Reviewed noncontributory    Social History:   Patient denies tobacco EtOH or illicit drug use     Home Medications:   Reviewed    Allergies:  Not on File    Review of Systems   All systems were reviewed and negative except for: As noted in HPI.  Patient also complains of burning epigastric pain and nausea.  He denies any chest pain.    Objective    Objective     Vitals:   Temp:  [97.8 °F (36.6 °C)] 97.8 °F (36.6 °C)  Heart Rate:  [84-94] 89  Resp:  [17-32] 20  BP: (128-148)/(63-99) 145/81  Flow (L/min):  [1.5-3] 3    Physical Exam    Constitutional: Awake, alert, no acute distress   Eyes: Pupils equal, sclerae anicteric, no conjunctival injection   HENT: NCAT, mucous membranes moist   Neck: Supple, no thyromegaly, no lymphadenopathy, trachea midline   Respiratory: Diminished breath sounds in  all lung fields very faint expiratory wheezes bilaterally, nonlabored respirations    Cardiovascular: RRR, no murmurs, palpable pedal pulses bilaterally   Gastrointestinal: Positive bowel sounds, soft, nontender, nondistended   Musculoskeletal: No bilateral ankle edema, no clubbing or cyanosis to extremities   Psychiatric: Appropriate affect, cooperative   Neurologic: Oriented x 3, in all extremities equally no focal weakness appreciated, Cranial Nerves grossly intact, speech clear   Skin: No rashes     Result Review    Result Review:  I have personally reviewed the results from the time of this admission to 12/31/2022 16:36 EST and agree with these findings:  [x]  Laboratory  []  Microbiology  [x]  Radiology  [x]  EKG/Telemetry   []  Cardiology/Vascular   []  Pathology  [x]  Old records  []  Other:      Assessment & Plan   Assessment / Plan     Assessment/Plan:   • Acute COPD exacerbation with acute bronchitis  • Acute hypoxic respiratory failure  • Hypothyroidism      Admit to the hospital service to telemetry monitor bed.  Continue nebulizer treatment with albuterol Atrovent nebs scheduled and albuterol nebs as needed.  Continue supplemental O2 to maintain sats greater equal to 89%.  Continue Solu-Medrol 40 mg IV every 12 hours.  Initiate doxycycline 100 mg p.o. twice daily.  Initiate mucolytic therapies and cough meds  Check respiratory panel  Check sputum culture  Patient does not follow with a pulmonologist currently as an outpatient, will consult RT  to make these arrangements.    DVT prophylaxis:  No DVT prophylaxis order currently exists.    CODE STATUS:    Code Status (Patient has no pulse and is not breathing): CPR (Attempt to Resuscitate)  Medical Interventions (Patient has pulse or is breathing): Full Support      Admission Status:  I believe this patient meets inpatient status.    Electronically signed by Messi Painter MD, 12/31/22, 4:36 PM EST.             Electronically signed by  "Messi Painter MD at 01/01/23 1322          Emergency Department Notes      Marshall Hutton MD at 12/31/22 1312          Time: 1:12 PM EST  Date of encounter:  12/31/2022  Independent Historian/Clinical History and Information was obtained by:   Patient  Chief Complaint: SOB    History is limited by: N/A    History of Present Illness:  Patient is a 57 y.o. year old male who presents to the emergency department for evaluation of SOB. Pt notes SOB is worsening over the last 5 days. Pt notes he has had a cough with discolored phlem for the past 5 days. Pt notes Hx of COPD. Pt denies smoking but reports dipping. Pt also states he has stomach pain where his ulcers are.         History provided by:  Patient   used: No        Patient Care Team  Primary Care Provider: Margie Garcia APRN    Past Medical History:     Not on File  No past medical history on file.  No past surgical history on file.  No family history on file.    Home Medications:  Prior to Admission medications    Not on File        Social History:          Review of Systems:  Review of Systems   Constitutional: Negative for chills and fever.   HENT: Negative for congestion, rhinorrhea and sore throat.    Eyes: Negative for pain and visual disturbance.   Respiratory: Positive for cough and shortness of breath. Negative for apnea and chest tightness.    Cardiovascular: Negative for chest pain and palpitations.   Gastrointestinal: Negative for abdominal pain, diarrhea, nausea and vomiting.   Genitourinary: Negative for difficulty urinating and dysuria.   Musculoskeletal: Negative for joint swelling and myalgias.   Skin: Negative for color change.   Neurological: Negative for seizures and headaches.   Psychiatric/Behavioral: Negative.    All other systems reviewed and are negative.       Physical Exam:  /81   Pulse 92   Temp 97.8 °F (36.6 °C) (Oral)   Resp 20   Ht 170.2 cm (67\")   Wt 90.3 kg (199 lb)   SpO2 92%   BMI " 31.17 kg/m²     Physical Exam  Vitals and nursing note reviewed.   Constitutional:       General: He is not in acute distress.     Appearance: Normal appearance. He is not toxic-appearing.   HENT:      Head: Normocephalic and atraumatic.      Jaw: There is normal jaw occlusion.   Eyes:      General: Lids are normal.      Extraocular Movements: Extraocular movements intact.      Conjunctiva/sclera: Conjunctivae normal.      Pupils: Pupils are equal, round, and reactive to light.   Cardiovascular:      Rate and Rhythm: Normal rate and regular rhythm.      Pulses: Normal pulses.      Heart sounds: Normal heart sounds.   Pulmonary:      Effort: Pulmonary effort is normal. Prolonged expiration present. No respiratory distress.      Breath sounds: Wheezing (All lung fields) present. No rhonchi.   Abdominal:      General: Abdomen is flat.      Palpations: Abdomen is soft.      Tenderness: There is no abdominal tenderness. There is no guarding or rebound.   Musculoskeletal:         General: Normal range of motion.      Cervical back: Normal range of motion and neck supple.      Right lower leg: No edema.      Left lower leg: No edema.   Skin:     General: Skin is warm and dry.   Neurological:      Mental Status: He is alert and oriented to person, place, and time. Mental status is at baseline.   Psychiatric:         Mood and Affect: Mood normal.                 Procedures:  Procedures      Medical Decision Making:      Comorbidities that affect care:    COPD    External Notes reviewed:    None      The following orders were placed and all results were independently analyzed by me:  Orders Placed This Encounter   Procedures   • Respiratory Culture - Sputum, Cough   • COVID PRE-OP / PRE-PROCEDURE SCREENING ORDER (NO ISOLATION) - Swab, Nasopharynx   • COVID-19,APTIMA PANTHER([place]),[facility] YASIR/ ADARSH, NP/OP SWAB IN UTM/VTM/SALINE TRANSPORT MEDIA,24 HR TAT - Swab, Nasopharynx   • Influenza Antigen, Rapid - Swab, Nasopharynx   • XR  Chest 1 View   • [place] Draw   • Comprehensive Metabolic Panel   • BNP   • Troponin   • CBC Auto Differential   • Blood Gas, Arterial -With Co-Ox Panel: Yes   • Basic Metabolic Panel   • CBC Auto Differential   • Magnesium   • Phosphorus   • Respiratory Infection Panel A   • NPO Diet NPO Type: Strict NPO   • Undress & Gown   • Cardiac Monitoring   • Continuous Pulse Oximetry   • Vital Signs   • Reason for COPD Admission: New Oxygen Requirements   • Tobacco Cessation Education   • Respiratory Treatment Education (MDI / Spacer / Nebulizer)   • COPD Education   • Cough / Deep Breathe   • Tobacco Cessation Education   • Code Status and Medical Interventions:   • Inpatient Hospitalist Consult   • Document Pulse Oximetry - On Room Air / Home O2 Level   • Incentive Spirometry   • Oxygen Therapy- Nasal Cannula; Titrate for SPO2: Greater Than or Equal To, 88%   • ECG 12 Lead ED Triage Standing Order; SOA   • Insert Peripheral IV   • Inpatient Admission   • CBC & Differential   • Green Top (Gel)   • Lavender Top   • Gold Top - SST   • Light Blue Top       Medications Given in the Emergency Department:  Medications   sodium chloride 0.9 % flush 10 mL (has no administration in time range)   albuterol (PROVENTIL) nebulizer solution 0.083% 2.5 mg/3mL (has no administration in time range)   ipratropium-albuterol (DUO-NEB) nebulizer solution 3 mL (has no administration in time range)   benzonatate (TESSALON) capsule 200 mg (has no administration in time range)   guaiFENesin (MUCINEX) 12 hr tablet 1,200 mg (has no administration in time range)   diazePAM (VALIUM) tablet 5 mg (has no administration in time range)   pantoprazole (PROTONIX) EC tablet 40 mg (has no administration in time range)   methylPREDNISolone sodium succinate (SOLU-Medrol) injection 40 mg (has no administration in time range)   doxycycline (MONODOX) capsule 100 mg (has no administration in time range)   ondansetron (ZOFRAN) injection 4 mg (has no administration  in time range)   HYDROcod Polst-CPM Polst ER (TUSSIONEX PENNKINETIC) 10-8 MG/5ML ER suspension 5 mL (has no administration in time range)   hydrOXYzine (ATARAX) tablet 25 mg (has no administration in time range)   melatonin tablet 10 mg (has no administration in time range)   methylPREDNISolone sodium succinate (SOLU-Medrol) injection 125 mg (125 mg Intravenous Given 12/31/22 1440)   aluminum-magnesium hydroxide-simethicone (MAALOX MAX) 400-400-40 MG/5ML suspension 15 mL (15 mL Oral Given 12/31/22 1445)   Lidocaine Viscous HCl (XYLOCAINE) 2 % solution 15 mL (15 mL Mouth/Throat Given 12/31/22 1445)   ipratropium-albuterol (DUO-NEB) nebulizer solution 3 mL (3 mL Nebulization Given 12/31/22 1437)   albuterol (PROVENTIL) nebulizer solution 0.083% 2.5 mg/3mL (5 mg Nebulization Given 12/31/22 1558)        ED Course:         Labs:    Lab Results (last 24 hours)     Procedure Component Value Units Date/Time    CBC & Differential [[id number]]  (Abnormal) Collected: 12/31/22 1235    Specimen: Blood Updated: 12/31/22 1243    Narrative:      The following orders were created for panel order CBC & Differential.  Procedure                               Abnormality         Status                     ---------                               -----------         ------                     CBC Auto Differential[[id number]]        Abnormal            Final result                 Please view results for these tests on the individual orders.    Comprehensive Metabolic Panel [[id number]]  (Abnormal) Collected: 12/31/22 1235    Specimen: Blood Updated: 12/31/22 1301     Glucose 101 mg/dL      BUN 9 mg/dL      Creatinine 0.85 mg/dL      Sodium 136 mmol/L      Potassium 4.3 mmol/L      Chloride 98 mmol/L      CO2 30.8 mmol/L      Calcium 9.3 mg/dL      Total Protein 7.1 g/dL      Albumin 4.0 g/dL      ALT (SGPT) 15 U/L      AST (SGOT) 17 U/L      Alkaline Phosphatase 74 U/L      Total Bilirubin 0.5 mg/dL      Globulin 3.1 gm/dL      A/G Ratio  1.3 g/dL      BUN/Creatinine Ratio 10.6     Anion Gap 7.2 mmol/L      eGFR 100.1 mL/min/1.73      Comment: National Kidney Foundation and American Society of Nephrology (ASN) Task Force recommended calculation based on the Chronic Kidney Disease Epidemiology Collaboration (CKD-EPI) equation refit without adjustment for race.       Narrative:      GFR Normal >60  Chronic Kidney Disease <60  Kidney Failure <15      BNP [[id number]]  (Normal) Collected: 12/31/22 1235    Specimen: Blood Updated: 12/31/22 1259     proBNP 110.9 pg/mL     Narrative:      Among patients with dyspnea, NT-proBNP is highly sensitive for the detection of acute congestive heart failure. In addition NT-proBNP of <300 pg/ml effectively rules out acute congestive heart failure with 99% negative predictive value.      Troponin [[id number]]  (Normal) Collected: 12/31/22 1235    Specimen: Blood Updated: 12/31/22 1301     Troponin T <0.010 ng/mL     Narrative:      Troponin T Reference Range:  <= 0.03 ng/mL-   Negative for AMI  >0.03 ng/mL-     Abnormal for myocardial necrosis.  Clinicians would have to utilize clinical acumen, EKG, Troponin and serial changes to determine if it is an Acute Myocardial Infarction or myocardial injury due to an underlying chronic condition.       Results may be falsely decreased if patient taking Biotin.      CBC Auto Differential [[id number]]  (Abnormal) Collected: 12/31/22 1235    Specimen: Blood Updated: 12/31/22 1243     WBC 9.27 10*3/mm3      RBC 5.53 10*6/mm3      Hemoglobin 15.6 g/dL      Hematocrit 48.2 %      MCV 87.2 fL      MCH 28.2 pg      MCHC 32.4 g/dL      RDW 13.4 %      RDW-SD 42.8 fl      MPV 10.1 fL      Platelets 190 10*3/mm3      Neutrophil % 76.7 %      Lymphocyte % 10.6 %      Monocyte % 9.2 %      Eosinophil % 2.0 %      Basophil % 1.3 %      Immature Grans % 0.2 %      Neutrophils, Absolute 7.11 10*3/mm3      Lymphocytes, Absolute 0.98 10*3/mm3      Monocytes, Absolute 0.85 10*3/mm3       Eosinophils, Absolute 0.19 10*3/mm3      Basophils, Absolute 0.12 10*3/mm3      Immature Grans, Absolute 0.02 10*3/mm3      nRBC 0.0 /100 WBC     Respiratory Infection Panel A [[id number]] Collected: 12/31/22 1235    Specimen: Blood Updated: 12/31/22 1638    Blood Gas, Arterial -With Co-Ox Panel: Yes [[id number]]  (Abnormal) Collected: 12/31/22 1438    Specimen: Arterial Blood Updated: 12/31/22 1500     pH, Arterial 7.364 pH units      pCO2, Arterial 54.1 mm Hg      pO2, Arterial 65.3 mm Hg      HCO3, Arterial 30.2 mmol/L      Base Excess, Arterial 3.3 mmol/L      O2 Saturation, Arterial 92.6 %      Hemoglobin, Blood Gas 16.2 g/dL      Carboxyhemoglobin 1.2 %      Methemoglobin 0.30 %      Oxyhemoglobin 91.2 %      FHHB 7.3 %      Site Arterial: right brachial     Modality Cannula     Flow Rate 1.5 lpm     COVID PRE-OP / PRE-PROCEDURE SCREENING ORDER (NO ISOLATION) - Swab, Nasopharynx [[id number]] Collected: 12/31/22 1733    Specimen: Swab from Nasopharynx Updated: 12/31/22 1739    Narrative:      The following orders were created for panel order COVID PRE-OP / PRE-PROCEDURE SCREENING ORDER (NO ISOLATION) - Swab, Nasopharynx.  Procedure                               Abnormality         Status                     ---------                               -----------         ------                     COVID-19,APTIMA PANTHER(...[[id number]]                      In process                   Please view results for these tests on the individual orders.    COVID-19,APTIMA PANTHER([place]), YASIR/ ADARSH, NP/OP SWAB IN UTM/VTM/SALINE TRANSPORT MEDIA,24 HR TAT - Swab, Nasopharynx [[id number]] Collected: 12/31/22 1733    Specimen: Swab from Nasopharynx Updated: 12/31/22 1739    Influenza Antigen, Rapid - Swab, Nasopharynx [[id number]] Collected: 12/31/22 1733    Specimen: Swab from Nasopharynx Updated: 12/31/22 1741           Imaging:    XR Chest 1 View    Result Date: 12/31/2022  PROCEDURE: XR CHEST 1 VW  COMPARISON: [facility]  [facility], , CHEST PA/AP & LAT 2V, 9/18/2015, 11:54.  INDICATIONS: SOA Triage Protocol  FINDINGS:  Severe emphysematous changes are noted.  Postoperative changes are noted in the mid to upper right lung field.  Coarse interstitial markings in the mid and lower lung fields bilaterally are suspected to represent chronic interstitial lung disease.  No acute infiltrate or effusion is seen.  The cardiac and mediastinal silhouettes appear normal.        1. A severe emphysematous changes 2. Probable scarring/fibrosis in the mid and lower lung fields bilaterally. 3. No acute consolidation identified.       Alok Humphrey M.D.       Electronically Signed and Approved By: Alok Humphrey M.D. on 12/31/2022 at 13:14                 Differential Diagnosis and Discussion:    Dyspnea: Differential diagnosis includes but is not limited to metabolic acidosis, neurological disorders, psychogenic, asthma, pneumothorax, upper airway obstruction, COPD, pneumonia, noncardiogenic pulmonary edema, interstitial lung disease, anemia, congestive heart failure, and pulmonary embolism    All labs were reviewed and analyzed by me.  All X-rays were independently reviewed by me.    MDM  Number of Diagnoses or Management Options  Acute exacerbation of chronic obstructive pulmonary disease (COPD) (HCC)  Diagnosis management comments: In summary this is a 57-year-old male with a history of COPD that is normally not oxygen requiring who presents to the emergency department with complaints of shortness of breath.  On arrival he is mildly hypoxic and did require nasal cannula oxygen supplementation.  On examination he was wheezing significantly with a prolonged expiratory phase and minimal air movement as a result.  Chest x-ray unremarkable.  CBC independently reviewed by me and shows no critical abnormalities.  CMP independently reviewed by me and shows no critical abnormalities.  Patient was given Solu-Medrol, breathing treatments with minimal  improvement.  Attempted to wean him off of the nasal cannula oxygen however became hypoxic at rest as low as 87% when doing so.  He was therefore placed back on nasal cannula oxygen.  Patient will be admitted to the hospital for further treatment of since acute exacerbation of COPD.  ABG was performed and does not reveal hypercapnia but does show hypoxia.  Patient case has been discussed with the hospitalist team who will admit to the hospital for further evaluation and continuation of treatment.    Patient Progress  Patient progress: stable (Pt was informed of all test results and all questions answered)           Patient Care Considerations:    I considered ordering a CT scan of the chest, however Chest x-ray was clear      Consultants/Shared Management Plan:    Hospitalist: I have discussed the case with Admitting physician who agrees to accept the patient for admission.    Social Determinants of Health:    Patient is independent, reliable, and has access to care.       Disposition and Care Coordination:    Admit:   Through independent evaluation of the patient's history, physical, and imperical data, the patient meets criteria for observation/admission to the hospital.        Final diagnoses:   Acute exacerbation of chronic obstructive pulmonary disease (COPD) (HCC)   Hypoxia        ED Disposition     ED Disposition   Decision to Admit    Condition   --    Comment   Level of Care: Remote Telemetry [26]   Diagnosis: Acute exacerbation of chronic obstructive pulmonary disease (COPD) (HCC) [524243]   Certification: I Certify That Inpatient Hospital Services Are Medically Necessary For Greater Than 2 Midnights               This medical record created using voice recognition software.      Documentation assistance provided by Andrea leavitt, acting as scribe for Marshall Hutton MD. Information recorded by the scribe was done at my direction and has been verified and validated by me.     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    Interval follow-up: Patient seen and examined this morning, no acute distress, no acute major night events, prior to seeing the patient I ordered a CT scan of his chest and scheduled nebs as well as steroids, he continues to have diminished breath sounds and dyspnea on exertion.  Denies chest pain or palpitations but does have some chest discomfort with coughing.  Difficulty clearing sputum.  On 3 L nasal cannula.  I consulted pulmonary given the severity of his COPD in addition to after rounds finding a 1.5 cm mass in the left lower lobe on CT scan I pursued this morning.  Telemetry reviewed intermittently tachycardic, few bursts of PVCs, baseline sinus rhythm in the 90s.  Glucose 184, on steroids.  CBC 8.1, elevated neutrophils.  COVID-negative, flu negative, Legionella negative, mycoplasma negative,  strep negative    Review of systems:  All systems reviewed and negative except for cough, shortness of breath, lower extremity edema, generalized fatigue, generalized weakness    Objective   Objective     Vitals:   Temp:  [97.5 °F (36.4 °C)-98.6 °F (37 °C)] 98.2 °F (36.8 °C)  Heart Rate:  [70-96] 90  Resp:  [17-32] 18  BP: (117-154)/(54-99) 141/79  Flow (L/min):  [1.5-3] 3  Physical Exam    Constitutional: Awake, alert, no acute distress sitting upright at the edge of the bed leaning over the bedside table, on 2 L nasal cannula   Eyes: Pupils equal, sclerae anicteric, no conjunctival injection   HENT: NCAT, mucous membranes moist   Neck: Supple, no thyromegaly, no lymphadenopathy, trachea midline   Respiratory: Significantly diminished breath sounds throughout with diffuse wheezing   Cardiovascular: RRR, no murmurs, rubs, or gallops, palpable pedal pulses bilaterally   Gastrointestinal: Positive bowel sounds, soft, nontender, nondistended   Musculoskeletal: Trace bilateral ankle edema, no clubbing or cyanosis to extremities   Psychiatric: Appropriate affect, cooperative   Neurologic: Oriented x 3, strength symmetric in all extremities, Cranial Nerves grossly intact to confrontation, speech clear   Skin: No rashes visible on exposed skin      Result Review    Result Review:  I have personally reviewed the pertinent results from the past 24 hours to 1/1/2023 10:56 EST and agree with these findings:  [x]  Laboratory   CBC    CBC 12/31/22 1/1/23   WBC 9.27 8.13   RBC 5.53 5.62   Hemoglobin 15.6 16.1   Hematocrit 48.2 50.3   MCV 87.2 89.5   MCH 28.2 28.6   MCHC 32.4 32.0   RDW 13.4 13.2   Platelets 190 191           BMP    BMP 12/31/22 1/1/23   BUN 9 11   Creatinine 0.85 0.83   Sodium 136 136   Potassium 4.3 5.2   Chloride 98 98   CO2 30.8 (A) 27.0   Calcium 9.3 9.6   (A) Abnormal value       Comments are available for some flowsheets but are not being displayed.           LIVER FUNCTION TESTS:      Lab 12/31/22  8058    TOTAL PROTEIN 7.1   ALBUMIN 4.0   GLOBULIN 3.1   ALT (SGPT) 15   AST (SGOT) 17   BILIRUBIN 0.5   ALK PHOS 74       [x]  Microbiology No results found for: ACANTHNAEG, AFBCX, BPERTUSSISCX, BLOODCX  No results found for: BCIDPCR, CXREFLEX, CSFCX, CULTURETIS  No results found for: CULTURES, HSVCX, URCX  No results found for: EYECULTURE, GCCX, HSVCULTURE, LABHSV  No results found for: LEGIONELLA, MRSACX, MUMPSCX, MYCOPLASCX  No results found for: NOCARDIACX, STOOLCX  No results found for: THROATCX, UNSTIMCULT, URINECX, CULTURE, VZVCULTUR  No results found for: VIRALCULTU, WOUNDCX    [x]  Radiology CT Chest Without Contrast Diagnostic    Result Date: 1/1/2023  PROCEDURE: CT CHEST WO CONTRAST DIAGNOSTIC  COMPARISON: [facility], CR, XR CHEST 1 VW, 12/31/2022, 12:45.  [facility], CT, CHEST W/O CONTRAST, 10/08/2014, 12:36.  [facility], CT, ABDOMEN/PELVIS WITH CONTRAST, 9/18/2015, 13:31.  INDICATIONS: Respiratory illness, nondiagnostic xray  TECHNIQUE: CT images were created without the administration of contrast material.   PROTOCOL:   Standard imaging protocol performed    RADIATION:   DLP: 359.7 mGy*cm   Automated exposure control was utilized to minimize radiation dose.  FINDINGS:  Lung window images reveal marked emphysema.  Pleural thickening in the right hemithorax with calcification appear stable.  Scarring in the right lower lobe is unchanged.  1.5 cm ill-defined noncalcified nodule is seen in the left lower lobe, well seen on series 202, image 99. This is suspicious for bronchogenic malignancy or lung cancer, a small bronchus is seen centrally.  Mediastinal windows reveal no mediastinal or axillary adenopathy.  Extensive coronary artery calcifications are evident.  Mild degenerative spurring is seen in the thoracic spine.         CT scan of the chest with IV contrast demonstrating marked emphysema.  1.5 cm mass in the left lower lobe is suspicious for bronchogenic  malignancy or lung cancer.     DANETTE KINCAID MD       Electronically Signed and Approved By: DANETTE KINCAID MD on 1/01/2023 at 9:42             XR Chest 1 View    Result Date: 12/31/2022  PROCEDURE: XR CHEST 1 VW  COMPARISON: [facility], CR, CHEST PA/AP & LAT 2V, 9/18/2015, 11:54.  INDICATIONS: SOA Triage Protocol  FINDINGS:  Severe emphysematous changes are noted.  Postoperative changes are noted in the mid to upper right lung field.  Coarse interstitial markings in the mid and lower lung fields bilaterally are suspected to represent chronic interstitial lung disease.  No acute infiltrate or effusion is seen.  The cardiac and mediastinal silhouettes appear normal.        1. A severe emphysematous changes 2. Probable scarring/fibrosis in the mid and lower lung fields bilaterally. 3. No acute consolidation identified.       Alok Humphrey M.D.       Electronically Signed and Approved By: Alok Humphrey M.D. on 12/31/2022 at 13:14               [x]  EKG/Telemetry   []  Cardiology/Vascular   []  Pathology  [x]  Old records  []  Other:    Assessment & Plan   Assessment / Plan     Assessment/Plan:  Assessment:  COPD with acute exacerbation  Acute hypoxemic respiratory failure  1.5 cm mass in the left lower lobe suspicious of bronchogenic malignancy/cancer  Ex-smoker; heavy tobacco use in the past  Work-related environmental exposures of the lung  Hypothyroidism  Essential hypertension    Plan:  Labs and imaging reviewed  Start Brovana Pulmicort nebs twice daily  Start Solu-Medrol 40 mg IV every 8 hours  Continue doxycycline 100 mg twice daily  Continue DuoNebs every 6 hours  Bronchopulmonary hygiene protocol Nexium bronchodilator protocol  Supplement oxygen to maintain sats greater 92%  CT scan of the chest reviewed  Pulmonary consulted discussed with Dr. Green, follow-up recommendations; will likely need bronchoscopy  Follow-up sputum culture  Continue telemetry monitoring  A.m. labs  Full code  DVT  prophylaxis with Lovenox  Monitoring blood counts while on Lovenox  Clinical course to dictate further management  Discussed with nurse at the bedside    DVT prophylaxis:  Medical DVT prophylaxis orders are present.    CODE STATUS:   Code Status (Patient has no pulse and is not breathing): CPR (Attempt to Resuscitate)  Medical Interventions (Patient has pulse or is breathing): Full Support        Electronically signed by Liam Ramos MD, 01/01/23, 10:56 AM EST.    Portions of this documentation were transcribed electronically from a voice recognition software.  I confirm all data accurately represents the service(s) I performed at today's visit.               Electronically signed by Liam Ramos MD at 01/01/23 1057       Consult Notes (last 24 hours)  Notes from 12/31/22 1552 through 01/01/23 1552   No notes of this type exist for this encounter.

## 2023-01-01 NOTE — SIGNIFICANT NOTE
"   01/01/23 0845   Plan   Plan Patient reports lives at home with his spouse, adult children and grandchildren.  Good family support.   Patient reports being non-compliant with medications.  Reports they make him feel bad when he takes them so \"I just don't take them anymore\".  CM stress importance of medications but was patient choice.  Patient is agreeable to home O2 if needed at discharge.  Agreeable to use [employer] if needed.  Patient provide own IADL's.  Facesheet verified.  CM updated contact information to his spouse Sneha.  Patient plans to return home with family.  Possible 6MWT needed at [facility].       "

## 2023-01-01 NOTE — CONSULTS
[facility]   Consult Note    Patient Name: Delvis Boston  : 1965  MRN: [id number]  Primary Care Physician:  Margie Garcia APRN  Referring Physician: Marshall Hutton MD  Date of admission: 2022    Consults  Subjective   Subjective     Reason for Consult/ Chief Complaint: Reason for consultation pulmonary nodule    History of Present Illness  Delvis Boston is a 57 y.o. male past medical history of COPD presents to the emergency room on 2022 with complaints of shortness of breath.  The patient's been having shortness of breath for 1 week he has a cough the cough is productive of green sputum denies have any hemoptysis denies have any fevers denies any chills has severe shortness of breath and shortness of breath with any exertional activity does have a lot of anxiety denies having any nausea no vomiting no diarrhea no other aggravating factors no other relieving factors been on bronchodilator therapies but persistent shortness of breath.  Underwent a CT scan CT scan shows a suspicious left lower lobe pulmonary nodule pulmonary has been consulted for evaluation of this pulmonary nodule.  He has no swollen or enlarged lymph nodes he does have a significant smoking history but quit smoking 15 years ago had a CAT scan 1 year ago and is unaware of any nodules.  Has a history of recurrent pneumothoraxes and has had pleurodesis of the right side in the past    Review of Systems   Constitutional: Positive for activity change and fatigue.   HENT: Negative.    Eyes: Negative.    Respiratory: Positive for cough, chest tightness, shortness of breath and wheezing.    Cardiovascular: Negative.    Gastrointestinal: Negative.    Endocrine: Negative.    Genitourinary: Negative.    Musculoskeletal: Negative.    Skin: Negative.    Allergic/Immunologic: Negative.    Neurological: Negative.    Hematological: Negative.    Psychiatric/Behavioral: The patient is nervous/anxious.         Personal History    Past medical history  Has history of COPD  Has history of recurrent pneumothoraces      Surgical history  Patient has past surgical history of pleurodesis on the right side    Family History: family history is not on file. Otherwise pertinent FHx was reviewed and not pertinent to current issue.    Social History:  reports that he has quit smoking. His smoking use included cigarettes. He has a 25.00 pack-year smoking history. He has never used smokeless tobacco. He reports that he does not drink alcohol and does not use drugs.    Home Medications:   Fluticasone Furoate-Vilanterol, ipratropium-albuterol, levothyroxine, and lisinopril    Allergies:  Not on File    Objective    Objective     Vitals:  Temp:  [97.5 °F (36.4 °C)-98.2 °F (36.8 °C)] 98.1 °F (36.7 °C)  Heart Rate:  [] 101  Resp:  [16-20] 18  BP: (117-141)/(54-79) 128/66  Flow (L/min):  [3-3.5] 3    Physical Exam  Vital Signs Reviewed  General WDWN, Alert, NAD.    HEENT:  PERRL, EOMI.  OP, nares clear, no sinus tenderness  Neck:  Supple, no JVD, no thyromegaly  Lymph: no axillary, cervical, supraclavicular lymphadenopathy noted bilaterally  Chest: Poor air exchange, tympanic to percussion bilaterally, no work of breathing noted  CV: RRR, no MGR, pulses 2+, equal.  Abd:  Soft, NT, ND, + BS, no HSM  EXT:  no clubbing, no cyanosis, no edema, no joint tenderness  Neuro:  A&Ox3, CN grossly intact, no focal deficits.  Skin: No rashes or lesions noted  Result Review    Result Review:  I have personally reviewed the results from the time of this admission to 1/1/2023 18:12 EST and agree with these findings:  [x]  Laboratory  [x]  Microbiology  [x]  Radiology  [x]  EKG/Telemetry   [x]  Cardiology/Vascular   []  Pathology  []  Old records  []  Other:    Most notable findings include: Chest CT scan showing left lower lobe pulmonary nodule    Assessment & Plan   Assessment / Plan     Brief Patient Summary:  Delvis Boston is a 57 y.o. male who in the hospital  with COPD exacerbation found to have a left lower lobe lung lesion    Active Hospital Problems:  Active Hospital Problems    Diagnosis    • **Acute exacerbation of chronic obstructive pulmonary disease (COPD) (HCC)    • COPD exacerbation (HCC)    Acute respiratory failure with hypoxia and hypercapnia  Left lower lobe pulmonary nodule    Plan:   CT scan reviewed showing extensive emphysema and a left lower lobe pulmonary nodule    Differential diagnosis includes infectious versus inflammatory versus early bronchogenic carcinoma    At this time would recommend a PET scan in 2 to 4 weeks    Start Brovana and Pulmicort    Start DuoNebs    Continue IV Solu-Medrol 40 every 8 hours    Continue current antibiotics    I personally reviewed all imaging, laboratory data, and I spoke with respiratory therapy, and nursing regarding the patient's care, have also spoken with the patient's primary admitting physician regarding his plan of care.    Electronically signed by Delvis Green DO, 01/01/23, 6:12 PM EST.

## 2023-01-02 PROCEDURE — 94664 DEMO&/EVAL PT USE INHALER: CPT

## 2023-01-02 PROCEDURE — 99233 SBSQ HOSP IP/OBS HIGH 50: CPT | Performed by: FAMILY MEDICINE

## 2023-01-02 PROCEDURE — 94799 UNLISTED PULMONARY SVC/PX: CPT

## 2023-01-02 PROCEDURE — 99233 SBSQ HOSP IP/OBS HIGH 50: CPT | Performed by: INTERNAL MEDICINE

## 2023-01-02 PROCEDURE — 25010000002 ENOXAPARIN PER 10 MG: Performed by: INTERNAL MEDICINE

## 2023-01-02 PROCEDURE — 25010000002 METHYLPREDNISOLONE PER 40 MG: Performed by: FAMILY MEDICINE

## 2023-01-02 RX ORDER — ALPRAZOLAM 0.25 MG/1
0.5 TABLET ORAL 3 TIMES DAILY PRN
Status: DISCONTINUED | OUTPATIENT
Start: 2023-01-02 | End: 2023-01-06 | Stop reason: HOSPADM

## 2023-01-02 RX ORDER — IPRATROPIUM BROMIDE AND ALBUTEROL SULFATE 2.5; .5 MG/3ML; MG/3ML
3 SOLUTION RESPIRATORY (INHALATION) EVERY 6 HOURS PRN
Status: DISCONTINUED | OUTPATIENT
Start: 2023-01-02 | End: 2023-01-06 | Stop reason: HOSPADM

## 2023-01-02 RX ADMIN — DOXYCYCLINE 100 MG: 100 CAPSULE ORAL at 21:46

## 2023-01-02 RX ADMIN — GUAIFENESIN 1200 MG: 600 TABLET ORAL at 09:41

## 2023-01-02 RX ADMIN — HYDROXYZINE HYDROCHLORIDE 25 MG: 25 TABLET, FILM COATED ORAL at 09:41

## 2023-01-02 RX ADMIN — PANTOPRAZOLE SODIUM 40 MG: 40 TABLET, DELAYED RELEASE ORAL at 06:13

## 2023-01-02 RX ADMIN — Medication 10 MG: at 21:45

## 2023-01-02 RX ADMIN — ARFORMOTEROL TARTRATE 15 MCG: 15 SOLUTION RESPIRATORY (INHALATION) at 19:01

## 2023-01-02 RX ADMIN — BUDESONIDE 0.5 MG: 0.5 INHALANT ORAL at 19:01

## 2023-01-02 RX ADMIN — DOXYCYCLINE 100 MG: 100 CAPSULE ORAL at 09:41

## 2023-01-02 RX ADMIN — BUDESONIDE 0.5 MG: 0.5 INHALANT ORAL at 08:01

## 2023-01-02 RX ADMIN — ALPRAZOLAM 0.5 MG: 0.25 TABLET ORAL at 19:47

## 2023-01-02 RX ADMIN — ALPRAZOLAM 0.5 MG: 0.25 TABLET ORAL at 12:47

## 2023-01-02 RX ADMIN — METHYLPREDNISOLONE SODIUM SUCCINATE 40 MG: 40 INJECTION, POWDER, FOR SOLUTION INTRAMUSCULAR; INTRAVENOUS at 09:41

## 2023-01-02 RX ADMIN — Medication 10 ML: at 21:40

## 2023-01-02 RX ADMIN — IPRATROPIUM BROMIDE AND ALBUTEROL SULFATE 3 ML: 2.5; .5 SOLUTION RESPIRATORY (INHALATION) at 08:01

## 2023-01-02 RX ADMIN — LEVOTHYROXINE SODIUM 100 MCG: 0.1 TABLET ORAL at 06:13

## 2023-01-02 RX ADMIN — ENOXAPARIN SODIUM 40 MG: 100 INJECTION SUBCUTANEOUS at 19:46

## 2023-01-02 RX ADMIN — METHYLPREDNISOLONE SODIUM SUCCINATE 40 MG: 40 INJECTION, POWDER, FOR SOLUTION INTRAMUSCULAR; INTRAVENOUS at 01:56

## 2023-01-02 RX ADMIN — LISINOPRIL 20 MG: 20 TABLET ORAL at 09:41

## 2023-01-02 RX ADMIN — METHYLPREDNISOLONE SODIUM SUCCINATE 40 MG: 40 INJECTION, POWDER, FOR SOLUTION INTRAMUSCULAR; INTRAVENOUS at 19:46

## 2023-01-02 RX ADMIN — ARFORMOTEROL TARTRATE 15 MCG: 15 SOLUTION RESPIRATORY (INHALATION) at 08:01

## 2023-01-02 RX ADMIN — GUAIFENESIN 1200 MG: 600 TABLET ORAL at 21:45

## 2023-01-02 RX ADMIN — HYDROXYZINE HYDROCHLORIDE 25 MG: 25 TABLET, FILM COATED ORAL at 21:57

## 2023-01-02 NOTE — CONSULTS
RT CM consulted to schedule follow up with COPD clinic and PFT.    Mr Boston is a 57 y.o. male former smoker (1 ppd, 25 pack-years) with past medical history of COPD and recurrent spontaneous pneumothoraces. Current home respiratory meds include Breo ellipta and combivent respimat.     AB.364/ 54.1/ 65.3/ 30.2/ 3.3   CT scan CT scan shows extensive emphysema and a left lower lobe pulmonary nodule   No PFT or PSG available    RT CM met with Mr Boston at the bedside to discuss COPD disease process and disease management. Mr Boston viewed [facility] COPD video and was provided a booklet as well.COPD action plan was discussed including use of albuterol/combivent prn for rescue and daily Breo. Breathing retrainment/pursed lip breathing was demonstrated as a way to help with SOA and control breathing with anxiety. Pt reports difficulty breathing when lying flat and agreed to trying bilevel  with Dr Joyner.        Pulmonary office emailed request for pulmonary follow up.  Outpatient PFT orders placed for cosign. RT CM will continue to follow.

## 2023-01-02 NOTE — PROGRESS NOTES
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  Will need outpatient PET scan and PFTs.     Interval follow-up: Patient seen and examined this morning, no acute distress, no acute major night events, still on nasal cannula oxygen at 1-1/2 to 2 L, symptomatic COPD with wheezing, sats in the low 90s, high 80s, just dipped down into the low 80s at times.  Has difficulty catching his breath.  Remains very anxious.  No chest pain or palpitations.  Slow to improve.  Notes his secretions are very dry difficult to clear.  Telemetry reviewed, intermittently tachycardic, baseline heart rate sinus in the 80s.  His anxiety is getting the best of him.  Sputum culture is growing in the lab still, organism under investigation.     Review of systems:  All systems reviewed and negative except for ongoing cough, shortness of breath with exertion and sometimes with rest, lower extremity edema, generalized fatigue, generalized weakness, anxiety       Objective   Objective     Vitals:   Temp:  [97.2 °F (36.2 °C)-98.4 °F (36.9 °C)] 97.9 °F (36.6 °C)  Heart Rate:  [] 92  Resp:  [18-20] 20  BP:  (106-128)/(42-68) 125/64  Flow (L/min):  [1-3] 1  Physical Exam    Constitutional: Awake, alert, no acute distress sitting upright on 2 L nasal cannula, shaking anxious              Eyes: Pupils equal, sclerae anicteric, no conjunctival injection              HENT: NCAT, mucous membranes moist              Neck: Supple, no thyromegaly, no lymphadenopathy, trachea midline              Respiratory: Significantly diminished breath sounds throughout with diffuse wheezing mostly expiratory              Cardiovascular: RRR, no murmurs, rubs, or gallops, palpable pedal pulses bilaterally              Gastrointestinal: Positive bowel sounds, soft, nontender, nondistended              Musculoskeletal: Trace bilateral ankle edema, no clubbing or cyanosis to extremities              Psychiatric: Appropriate affect, cooperative, anxious              Neurologic: Oriented x 3, strength symmetric in all extremities, Cranial Nerves grossly intact to confrontation, speech clear              Skin: No rashes visible on exposed skin    Result Review    Result Review:  I have personally reviewed the pertinent results from the past 24 hours to 1/2/2023 14:01 EST and agree with these findings:  [x]  Laboratory   CBC    CBC 12/31/22 1/1/23   WBC 9.27 8.13   RBC 5.53 5.62   Hemoglobin 15.6 16.1   Hematocrit 48.2 50.3   MCV 87.2 89.5   MCH 28.2 28.6   MCHC 32.4 32.0   RDW 13.4 13.2   Platelets 190 191           BMP    BMP 12/31/22 1/1/23   BUN 9 11   Creatinine 0.85 0.83   Sodium 136 136   Potassium 4.3 5.2   Chloride 98 98   CO2 30.8 (A) 27.0   Calcium 9.3 9.6   (A) Abnormal value       Comments are available for some flowsheets but are not being displayed.           LIVER FUNCTION TESTS:      Lab 12/31/22  1235   TOTAL PROTEIN 7.1   ALBUMIN 4.0   GLOBULIN 3.1   ALT (SGPT) 15   AST (SGOT) 17   BILIRUBIN 0.5   ALK PHOS 74       [x]  Microbiology No results found for: ACANTHNAEG, AFBCX, BPERTUSSISCX, BLOODCX  No results found for: BCIDPCR,  CXREFLEX, CSFCX, CULTURETIS  No results found for: CULTURES, HSVCX, URCX  No results found for: EYECULTURE, GCCX, HSVCULTURE, LABHSV  No results found for: LEGIONELLA, MRSACX, MUMPSCX, MYCOPLASCX  No results found for: NOCARDIACX, STOOLCX  No results found for: THROATCX, UNSTIMCULT, URINECX, CULTURE, VZVCULTUR  No results found for: VIRALCULTU, WOUNDCX    [x]  Radiology CT Chest Without Contrast Diagnostic    Result Date: 1/1/2023  PROCEDURE: CT CHEST WO CONTRAST DIAGNOSTIC  COMPARISON: [facility], CR, XR CHEST 1 VW, 12/31/2022, 12:45.  [facility], CT, CHEST W/O CONTRAST, 10/08/2014, 12:36.  [facility], CT, ABDOMEN/PELVIS WITH CONTRAST, 9/18/2015, 13:31.  INDICATIONS: Respiratory illness, nondiagnostic xray  TECHNIQUE: CT images were created without the administration of contrast material.   PROTOCOL:   Standard imaging protocol performed    RADIATION:   DLP: 359.7 mGy*cm   Automated exposure control was utilized to minimize radiation dose.  FINDINGS:  Lung window images reveal marked emphysema.  Pleural thickening in the right hemithorax with calcification appear stable.  Scarring in the right lower lobe is unchanged.  1.5 cm ill-defined noncalcified nodule is seen in the left lower lobe, well seen on series 202, image 99. This is suspicious for bronchogenic malignancy or lung cancer, a small bronchus is seen centrally.  Mediastinal windows reveal no mediastinal or axillary adenopathy.  Extensive coronary artery calcifications are evident.  Mild degenerative spurring is seen in the thoracic spine.         CT scan of the chest with IV contrast demonstrating marked emphysema.  1.5 cm mass in the left lower lobe is suspicious for bronchogenic malignancy or lung cancer.     DANETTE KINCAID MD       Electronically Signed and Approved By: DANETTE KINCAID MD on 1/01/2023 at 9:42             XR Chest 1 View    Result Date: 12/31/2022  PROCEDURE: XR CHEST 1 VW  COMPARISON: [facility], CR, CHEST PA/AP & LAT 2V, 9/18/2015, 11:54.  INDICATIONS: SOA Triage Protocol  FINDINGS:  Severe emphysematous changes are noted.  Postoperative changes are noted in the mid to upper right lung field.  Coarse interstitial markings in the mid and lower lung fields bilaterally are suspected to represent chronic interstitial lung disease.  No acute infiltrate or effusion is seen.  The cardiac and mediastinal silhouettes appear normal.        1. A severe emphysematous changes 2. Probable scarring/fibrosis in the mid and lower lung fields bilaterally. 3. No acute consolidation identified.       Alok Humphrey M.D.       Electronically Signed and Approved By: Alok Humphrey M.D. on 12/31/2022 at 13:14               [x]  EKG/Telemetry   []  Cardiology/Vascular   []  Pathology  [x]  Old records  []  Other:    Assessment & Plan   Assessment / Plan     Assessment/Plan:  Assessment:  COPD with acute exacerbation  Acute hypoxemic respiratory failure  1.5 cm mass in the left lower lobe suspicious of bronchogenic malignancy/cancer  Ex-smoker; heavy tobacco use in the past  Work-related environmental exposures of the lung  Hypothyroidism  Essential hypertension  Anxiety     Plan:  Labs and imaging reviewed  Start Xanax 0.5 mg 3 times daily as needed for anxiety  Continue with Brovana Pulmicort nebs twice daily  Continue with Solu-Medrol 40 mg IV every 8 hours  Continue doxycycline 100 mg twice daily  Continue DuoNebs every 6 hours  Bronchopulmonary hygiene protocol continue  Bronchodilator protocol continue  Supplement oxygen to maintain sats greater 92%  CT scan of the chest reviewed  Pulmonary consulted discussed with Dr. Joyner, will need outpatient PFTs and PET scan  Tessalon Perles for cough  Tussionex [employer] for cough  Has Atarax ordered for anxiety but refused 3 drug oat  Continued on levothyroxine 100 mcg daily  Continue lisinopril 20 mg daily  Follow-up sputum culture still under investigation in the  lab continue telemetry monitoring  A.m. labs  Full code  DVT prophylaxis with Lovenox  Monitoring blood counts while on Lovenox  Clinical course to dictate further management  Discussed with nurse at the bedside    DVT prophylaxis:  Medical DVT prophylaxis orders are present.    CODE STATUS:   Code Status (Patient has no pulse and is not breathing): CPR (Attempt to Resuscitate)  Medical Interventions (Patient has pulse or is breathing): Full Support        Electronically signed by Liam Ramos MD, 01/02/23, 2:01 PM EST.    Portions of this documentation were transcribed electronically from a voice recognition software.  I confirm all data accurately represents the service(s) I performed at today's visit.

## 2023-01-02 NOTE — PROGRESS NOTES
Pulmonary / Critical Care Progress Note      Patient Name: Delvis Boston  : 1965  MRN: [id number]  Primary Care Physician:  Margie Garcia APRN  Date of admission: 2022    Subjective   Subjective   Follow-up for COPD exacerbation, left lower lobe lung nodule, respiratory failure.     Over past 24 hours: remained on nebs- brovana, pulmicort, IV solu-medrol 40 IV q8 hrs, protonix 40 daily, doxy bid. RT  consulted.     No acute events overnight.     This morning,   Continues to have cough.  Has wheezing, not able to breathe as much  He is on Xanax 0.5 mg 3 times daily  Also on hydroxyzine 25 mg every 6 hours.  Has wheezing, chest tightness.  No fever or chills.  No nausea or vomiting.      Review of Systems  General:  Fatigue, No Fever  HEENT: No dysphagia, No Visual Changes, no rhinorrhea  Respiratory:  + cough,+Dyspnea, no phlegm, No Pleuritic Pain, + wheezing, no hemoptysis  Cardiovascular: Denies chest pain, denies palpitations,+RIVERA, No Chest Pressure  Gastrointestinal:  No Abdominal Pain, No Nausea, No Vomiting, No Diarrhea  Genitourinary:  No Dysuria, No Frequency, No Hesitancy  Musculoskeletal: No muscle pain or swelling  Endocrine:  No Heat Intolerance, No Cold Intolerance  Hematologic:  No Bleeding, No Bruising  Neurologic:  No Confusion, no Dysarthria, No Headaches  Skin:  No Rash, No Open Wounds          Objective   Objective     Vitals:   Temp:  [97.2 °F (36.2 °C)-98.4 °F (36.9 °C)] 98.1 °F (36.7 °C)  Heart Rate:  [] 78  Resp:  [16-20] 20  BP: (106-128)/(42-72) 122/68  Flow (L/min):  [1-3.5] 1     Physical Exam   Vital Signs Reviewed   General:  WDWN, Alert, NAD.    HEENT:  PERRL, EOMI.  OP, nares clear, no sinus tenderness  Neck:  Supple, no JVD, no thyromegaly  Chest:  good aeration, clear to auscultation bilaterally, tympanic to percussion bilaterally, no work of breathing noted  CV: RRR, no MGR, pulses 2+, equal.  Abd:  Soft, NT, ND, + BS, no HSM  EXT:  no  clubbing, no cyanosis, no edema  Neuro:  A&Ox3, CN grossly intact, no focal deficits.  Skin: No rashes or lesions noted      Result Review    Result Review:  I have personally reviewed the results from the time of this admission to 1/2/2023 08:51 EST and agree with these findings:  [x]  Laboratory  [x]  Microbiology  [x]  Radiology  [x]  EKG/Telemetry   [x]  Cardiology/Vascular   []  Pathology  []  Old records  []  Other:  Most notable findings include:         Lab 01/01/23  0638 12/31/22  1235   WBC 8.13 9.27   HEMOGLOBIN 16.1 15.6   HEMATOCRIT 50.3 48.2   PLATELETS 191 190   SODIUM 136 136   POTASSIUM 5.2 4.3   CHLORIDE 98 98   CO2 27.0 30.8*   BUN 11 9   CREATININE 0.83 0.85   GLUCOSE 184* 101*   CALCIUM 9.6 9.3   PHOSPHORUS 3.8  --    TOTAL PROTEIN  --  7.1   ALBUMIN  --  4.0   GLOBULIN  --  3.1     Resp culture negative.     CT Chest Without Contrast Diagnostic    Result Date: 1/1/2023  PROCEDURE: CT CHEST WO CONTRAST DIAGNOSTIC  COMPARISON: [facility], CR, XR CHEST 1 VW, 12/31/2022, 12:45.  [facility], CT, CHEST W/O CONTRAST, 10/08/2014, 12:36.  [facility], CT, ABDOMEN/PELVIS WITH CONTRAST, 9/18/2015, 13:31.  INDICATIONS: Respiratory illness, nondiagnostic xray  TECHNIQUE: CT images were created without the administration of contrast material.   PROTOCOL:   Standard imaging protocol performed    RADIATION:   DLP: 359.7 mGy*cm   Automated exposure control was utilized to minimize radiation dose.  FINDINGS:  Lung window images reveal marked emphysema.  Pleural thickening in the right hemithorax with calcification appear stable.  Scarring in the right lower lobe is unchanged.  1.5 cm ill-defined noncalcified nodule is seen in the left lower lobe, well seen on series 202, image 99. This is suspicious for bronchogenic malignancy or lung cancer, a small bronchus is seen centrally.  Mediastinal windows reveal no mediastinal or axillary adenopathy.  Extensive coronary  artery calcifications are evident.  Mild degenerative spurring is seen in the thoracic spine.       Impression:   CT scan of the chest with IV contrast demonstrating marked emphysema.  1.5 cm mass in the left lower lobe is suspicious for bronchogenic malignancy or lung cancer.     DANETTE KINCAID MD       Electronically Signed and Approved By: DANETTE KINCAID MD on 1/01/2023 at 9:42                 Assessment & Plan   Assessment / Plan     Active Hospital Problems:  Active Hospital Problems    Diagnosis    • **Acute exacerbation of chronic obstructive pulmonary disease (COPD) (HCC)    • COPD exacerbation (HCC)          Impression:  COPD with acute exacerbation  Left lower lobe pulmonary nodule  History of smoking in past    Plan:   CT chest with severe emphysema, left lower lobe pulmonary nodule  Continue Brovana and Pulmicort, DuoNeb.  Continue with IV Solu-Medrol 40 mg every 8 hours  Continue hydroxyzine and alprazolam for anxiety.  RT  is consulted.  Will need PFT, alpha-1 antitrypsin level and genetics as an outpatient  He will need NIPPV for home likely  Wean oxygen as tolerated.  We will consider BiPAP 12/5 with sleep tonight.  Check overnight oximetry tomorrow    DVT prophylaxis:  Medical DVT prophylaxis orders are present.    CODE STATUS:   Code Status (Patient has no pulse and is not breathing): CPR (Attempt to Resuscitate)  Medical Interventions (Patient has pulse or is breathing): Full Support    I personally reviewed pertinent labs, imaging and provider notes. Discussed with bedside nurse and will discuss with primary service.       Electronically signed by Ja Joyner MD, 1/2/2023, 08:51 EST.

## 2023-01-02 NOTE — PLAN OF CARE
Goal Outcome Evaluation:  Plan of Care Reviewed With: patient        Progress: no change  Outcome Evaluation: Patient on 3.5 liters NC, short of air when having a conversation and becomes anxious at times. Has benefitted from Atarax. VSS. No complains of pain, is self caring. VSS. Continue to monitor.

## 2023-01-03 ENCOUNTER — APPOINTMENT (OUTPATIENT)
Dept: CARDIOLOGY | Facility: HOSPITAL | Age: 58
DRG: 193 | End: 2023-01-03
Payer: MEDICARE

## 2023-01-03 PROBLEM — R09.02 HYPOXIA: Status: ACTIVE | Noted: 2022-12-31

## 2023-01-03 LAB
ALBUMIN SERPL-MCNC: 3.5 G/DL (ref 3.5–5.2)
ALP SERPL-CCNC: 75 U/L (ref 39–117)
ALT SERPL W P-5'-P-CCNC: 17 U/L (ref 1–41)
ANION GAP SERPL CALCULATED.3IONS-SCNC: 3.6 MMOL/L (ref 5–15)
AST SERPL-CCNC: 13 U/L (ref 1–40)
BACTERIA SPEC RESP CULT: ABNORMAL
BACTERIA SPEC RESP CULT: ABNORMAL
BASOPHILS # BLD AUTO: 0.03 10*3/MM3 (ref 0–0.2)
BASOPHILS NFR BLD AUTO: 0.2 % (ref 0–1.5)
BILIRUB CONJ SERPL-MCNC: <0.2 MG/DL (ref 0–0.3)
BILIRUB INDIRECT SERPL-MCNC: NORMAL MG/DL
BILIRUB SERPL-MCNC: <0.2 MG/DL (ref 0–1.2)
BUN SERPL-MCNC: 12 MG/DL (ref 6–20)
BUN/CREAT SERPL: 16.9 (ref 7–25)
CALCIUM SPEC-SCNC: 9.3 MG/DL (ref 8.6–10.5)
CHLORIDE SERPL-SCNC: 99 MMOL/L (ref 98–107)
CO2 SERPL-SCNC: 37.4 MMOL/L (ref 22–29)
CREAT SERPL-MCNC: 0.71 MG/DL (ref 0.76–1.27)
DEPRECATED RDW RBC AUTO: 45.3 FL (ref 37–54)
EGFRCR SERPLBLD CKD-EPI 2021: 105.7 ML/MIN/1.73
EOSINOPHIL # BLD AUTO: 0 10*3/MM3 (ref 0–0.4)
EOSINOPHIL NFR BLD AUTO: 0 % (ref 0.3–6.2)
ERYTHROCYTE [DISTWIDTH] IN BLOOD BY AUTOMATED COUNT: 13.4 % (ref 12.3–15.4)
GLUCOSE SERPL-MCNC: 168 MG/DL (ref 65–99)
GRAM STN SPEC: ABNORMAL
HCT VFR BLD AUTO: 48.4 % (ref 37.5–51)
HGB BLD-MCNC: 15.3 G/DL (ref 13–17.7)
IMM GRANULOCYTES # BLD AUTO: 0.21 10*3/MM3 (ref 0–0.05)
IMM GRANULOCYTES NFR BLD AUTO: 1.1 % (ref 0–0.5)
LYMPHOCYTES # BLD AUTO: 1.44 10*3/MM3 (ref 0.7–3.1)
LYMPHOCYTES NFR BLD AUTO: 7.4 % (ref 19.6–45.3)
MAGNESIUM SERPL-MCNC: 2.2 MG/DL (ref 1.6–2.6)
MCH RBC QN AUTO: 28.8 PG (ref 26.6–33)
MCHC RBC AUTO-ENTMCNC: 31.6 G/DL (ref 31.5–35.7)
MCV RBC AUTO: 91.1 FL (ref 79–97)
MONOCYTES # BLD AUTO: 1 10*3/MM3 (ref 0.1–0.9)
MONOCYTES NFR BLD AUTO: 5.2 % (ref 5–12)
NEUTROPHILS NFR BLD AUTO: 16.72 10*3/MM3 (ref 1.7–7)
NEUTROPHILS NFR BLD AUTO: 86.1 % (ref 42.7–76)
NRBC BLD AUTO-RTO: 0 /100 WBC (ref 0–0.2)
PHOSPHATE SERPL-MCNC: 3.3 MG/DL (ref 2.5–4.5)
PLATELET # BLD AUTO: 241 10*3/MM3 (ref 140–450)
PMV BLD AUTO: 10.5 FL (ref 6–12)
POTASSIUM SERPL-SCNC: 4.4 MMOL/L (ref 3.5–5.2)
PROT SERPL-MCNC: 6.5 G/DL (ref 6–8.5)
RBC # BLD AUTO: 5.31 10*6/MM3 (ref 4.14–5.8)
SODIUM SERPL-SCNC: 140 MMOL/L (ref 136–145)
WBC NRBC COR # BLD: 19.4 10*3/MM3 (ref 3.4–10.8)

## 2023-01-03 PROCEDURE — 80048 BASIC METABOLIC PNL TOTAL CA: CPT | Performed by: FAMILY MEDICINE

## 2023-01-03 PROCEDURE — 93306 TTE W/DOPPLER COMPLETE: CPT | Performed by: INTERNAL MEDICINE

## 2023-01-03 PROCEDURE — 25010000002 METHYLPREDNISOLONE PER 40 MG: Performed by: FAMILY MEDICINE

## 2023-01-03 PROCEDURE — 99233 SBSQ HOSP IP/OBS HIGH 50: CPT | Performed by: INTERNAL MEDICINE

## 2023-01-03 PROCEDURE — 80076 HEPATIC FUNCTION PANEL: CPT | Performed by: FAMILY MEDICINE

## 2023-01-03 PROCEDURE — 94799 UNLISTED PULMONARY SVC/PX: CPT

## 2023-01-03 PROCEDURE — 83735 ASSAY OF MAGNESIUM: CPT | Performed by: FAMILY MEDICINE

## 2023-01-03 PROCEDURE — 93306 TTE W/DOPPLER COMPLETE: CPT

## 2023-01-03 PROCEDURE — 85025 COMPLETE CBC W/AUTO DIFF WBC: CPT | Performed by: FAMILY MEDICINE

## 2023-01-03 PROCEDURE — 94664 DEMO&/EVAL PT USE INHALER: CPT

## 2023-01-03 PROCEDURE — 84100 ASSAY OF PHOSPHORUS: CPT | Performed by: FAMILY MEDICINE

## 2023-01-03 PROCEDURE — 25010000002 SULFUR HEXAFLUORIDE MICROSPH 60.7-25 MG RECONSTITUTED SUSPENSION: Performed by: INTERNAL MEDICINE

## 2023-01-03 RX ADMIN — METHYLPREDNISOLONE SODIUM SUCCINATE 40 MG: 40 INJECTION, POWDER, FOR SOLUTION INTRAMUSCULAR; INTRAVENOUS at 18:43

## 2023-01-03 RX ADMIN — SULFUR HEXAFLUORIDE 2 ML: KIT at 18:07

## 2023-01-03 RX ADMIN — ALPRAZOLAM 0.5 MG: 0.25 TABLET ORAL at 23:38

## 2023-01-03 RX ADMIN — Medication 10 ML: at 08:53

## 2023-01-03 RX ADMIN — HYDROXYZINE HYDROCHLORIDE 25 MG: 25 TABLET, FILM COATED ORAL at 18:43

## 2023-01-03 RX ADMIN — DOXYCYCLINE 100 MG: 100 CAPSULE ORAL at 08:53

## 2023-01-03 RX ADMIN — METHYLPREDNISOLONE SODIUM SUCCINATE 40 MG: 40 INJECTION, POWDER, FOR SOLUTION INTRAMUSCULAR; INTRAVENOUS at 10:39

## 2023-01-03 RX ADMIN — HYDROXYZINE HYDROCHLORIDE 25 MG: 25 TABLET, FILM COATED ORAL at 08:53

## 2023-01-03 RX ADMIN — GUAIFENESIN 1200 MG: 600 TABLET ORAL at 21:22

## 2023-01-03 RX ADMIN — DOXYCYCLINE 100 MG: 100 CAPSULE ORAL at 21:22

## 2023-01-03 RX ADMIN — LISINOPRIL 20 MG: 20 TABLET ORAL at 08:53

## 2023-01-03 RX ADMIN — LEVOTHYROXINE SODIUM 100 MCG: 0.1 TABLET ORAL at 06:32

## 2023-01-03 RX ADMIN — GUAIFENESIN 1200 MG: 600 TABLET ORAL at 08:53

## 2023-01-03 RX ADMIN — ARFORMOTEROL TARTRATE 15 MCG: 15 SOLUTION RESPIRATORY (INHALATION) at 07:31

## 2023-01-03 RX ADMIN — PANTOPRAZOLE SODIUM 40 MG: 40 TABLET, DELAYED RELEASE ORAL at 06:32

## 2023-01-03 RX ADMIN — ALPRAZOLAM 0.5 MG: 0.25 TABLET ORAL at 13:11

## 2023-01-03 RX ADMIN — Medication 10 MG: at 21:22

## 2023-01-03 RX ADMIN — BUDESONIDE 0.5 MG: 0.5 INHALANT ORAL at 07:31

## 2023-01-03 RX ADMIN — ARFORMOTEROL TARTRATE 15 MCG: 15 SOLUTION RESPIRATORY (INHALATION) at 19:19

## 2023-01-03 RX ADMIN — BUDESONIDE 0.5 MG: 0.5 INHALANT ORAL at 19:19

## 2023-01-03 RX ADMIN — METHYLPREDNISOLONE SODIUM SUCCINATE 40 MG: 40 INJECTION, POWDER, FOR SOLUTION INTRAMUSCULAR; INTRAVENOUS at 03:16

## 2023-01-03 NOTE — PLAN OF CARE
Goal Outcome Evaluation:  Plan of Care Reviewed With: patient        Progress: no change  Outcome Evaluation: Pt's O2 decreased earlier in the day per RT staff down to 1LPM cont per NC.  Patient dropped down to 88% while sitting on side of bed and talking.  Placed patient back on 2LPM cont per NC.  New orders for patient to utilize BiPAP while napping and at night.  Anxiety has increased - patient tolerated Xanax well and rested some this afternoon.

## 2023-01-03 NOTE — PROGRESS NOTES
Pulmonary / Critical Care Progress Note      Patient Name: Delvis Boston  : 1965  MRN: [id number]  Primary Care Physician:  Margie Garcia APRN  Date of admission: 2022    Subjective   Subjective   Follow-up for COPD exacerbation, left lower lobe lung nodule, respiratory failure.     Over past 24 hours: remained on nebs- brovana, pulmicort, IV solu-medrol 40 IV q8 hrs, protonix 40 daily, doxy bid. RT  on board.  Started on anxiolytics.    No acute events overnight.     This morning,   Continues to have cough.  Not able to get any secretions.  Has wheezing, not able to breathe as much  Remains on Xanax and hydroxyzine.  Has wheezing, chest tightness.  No fever or chills.  No nausea or vomiting.  Still has chest tightness.      Review of Systems  General:  Fatigue, No Fever  HEENT: No dysphagia, No Visual Changes, no rhinorrhea  Respiratory:  + cough,+Dyspnea, no phlegm, No Pleuritic Pain, + wheezing, no hemoptysis  Cardiovascular: Denies chest pain, denies palpitations,+RIVERA, No Chest Pressure  Gastrointestinal:  No Abdominal Pain, No Nausea, No Vomiting, No Diarrhea  Genitourinary:  No Dysuria, No Frequency, No Hesitancy  Musculoskeletal: No muscle pain or swelling  Endocrine:  No Heat Intolerance, No Cold Intolerance  Hematologic:  No Bleeding, No Bruising  Neurologic:  No Confusion, no Dysarthria, No Headaches  Skin:  No Rash, No Open Wounds          Objective   Objective     Vitals:   Temp:  [97.3 °F (36.3 °C)-97.9 °F (36.6 °C)] 97.3 °F (36.3 °C)  Heart Rate:  [74-92] 74  Resp:  [18-20] 18  BP: (112-136)/(64-77) 132/73  Flow (L/min):  [1-3] 2     Physical Exam   Vital Signs Reviewed   General:  WDWN, Alert, NAD.    HEENT:  PERRL, EOMI.  OP, nares clear, no sinus tenderness  Neck:  Supple, no JVD, no thyromegaly  Chest:  good aeration, clear to auscultation bilaterally, tympanic to percussion bilaterally, no work of breathing noted  CV: RRR, no MGR, pulses 2+, equal.  Abd:  Soft,  NT, ND, + BS, no HSM  EXT:  no clubbing, no cyanosis, no edema  Neuro:  A&Ox3, CN grossly intact, no focal deficits.  Skin: No rashes or lesions noted      Result Review    Result Review:  I have personally reviewed the results from the time of this admission to 1/3/2023 07:28 EST and agree with these findings:  [x]  Laboratory  [x]  Microbiology  [x]  Radiology  [x]  EKG/Telemetry   [x]  Cardiology/Vascular   []  Pathology  []  Old records  []  Other:  Most notable findings include:         Lab 01/03/23  0540 01/01/23  0638 12/31/22  1235   WBC 19.40* 8.13 9.27   HEMOGLOBIN 15.3 16.1 15.6   HEMATOCRIT 48.4 50.3 48.2   PLATELETS 241 191 190   SODIUM 140 136 136   POTASSIUM 4.4 5.2 4.3   CHLORIDE 99 98 98   CO2 37.4* 27.0 30.8*   BUN 12 11 9   CREATININE 0.71* 0.83 0.85   GLUCOSE 168* 184* 101*   CALCIUM 9.3 9.6 9.3   PHOSPHORUS 3.3 3.8  --    TOTAL PROTEIN 6.5  --  7.1   ALBUMIN 3.5  --  4.0   GLOBULIN  --   --  3.1     Resp culture negative.     CT Chest Without Contrast Diagnostic    Result Date: 1/1/2023  PROCEDURE: CT CHEST WO CONTRAST DIAGNOSTIC  COMPARISON: [facility], CR, XR CHEST 1 VW, 12/31/2022, 12:45.  [facility], CT, CHEST W/O CONTRAST, 10/08/2014, 12:36.  [facility], CT, ABDOMEN/PELVIS WITH CONTRAST, 9/18/2015, 13:31.  INDICATIONS: Respiratory illness, nondiagnostic xray  TECHNIQUE: CT images were created without the administration of contrast material.   PROTOCOL:   Standard imaging protocol performed    RADIATION:   DLP: 359.7 mGy*cm   Automated exposure control was utilized to minimize radiation dose.  FINDINGS:  Lung window images reveal marked emphysema.  Pleural thickening in the right hemithorax with calcification appear stable.  Scarring in the right lower lobe is unchanged.  1.5 cm ill-defined noncalcified nodule is seen in the left lower lobe, well seen on series 202, image 99. This is suspicious for bronchogenic malignancy or lung cancer, a small  bronchus is seen centrally.  Mediastinal windows reveal no mediastinal or axillary adenopathy.  Extensive coronary artery calcifications are evident.  Mild degenerative spurring is seen in the thoracic spine.       Impression:   CT scan of the chest with IV contrast demonstrating marked emphysema.  1.5 cm mass in the left lower lobe is suspicious for bronchogenic malignancy or lung cancer.     DANETTE KINCAID MD       Electronically Signed and Approved By: DANETTE KINCAID MD on 1/01/2023 at 9:42                 Assessment & Plan   Assessment / Plan     Active Hospital Problems:  Active Hospital Problems    Diagnosis    • **Acute exacerbation of chronic obstructive pulmonary disease (COPD) (HCC)    • COPD exacerbation (HCC)          Impression:  COPD with acute exacerbation  Left lower lobe pulmonary nodule  History of smoking in past    Plan:   CT chest with severe emphysema, left lower lobe pulmonary nodule  Continue Brovana and PulmicortMargaritab.  Continue with IV Solu-Medrol 40 mg every 8 hours  Continue hydroxyzine and alprazolam for anxiety.  Clinically not improved with aggressive treatment medically.  Discussed bronchoscopy with bronchoalveolar lavage, bronchial washing, possible transbronchial biopsy, endobronchial brushing, airway inspection.  Risks and benefits of the procedure were discussed in detail.  Alternatives and options were reviewed.  Risks including pneumothorax, pneumonia, bleeding, respiratory depression and that it could be fatal were all reviewed.  Patient understands and is agreeable for the procedure.  N.p.o. after midnight.  RT  is consulted.  Will need PFT, alpha-1 antitrypsin level and genetics as an outpatient  He will need NIPPV for home likely  Wean oxygen as tolerated.  We will consider BiPAP 12/5 with sleep tonight.  Check overnight oximetry tomorrow    DVT prophylaxis:  Medical DVT prophylaxis orders are present.    CODE STATUS:   Code Status (Patient has no pulse and is  not breathing): CPR (Attempt to Resuscitate)  Medical Interventions (Patient has pulse or is breathing): Full Support    I personally reviewed pertinent labs, imaging and provider notes. Discussed with bedside nurse and will discuss with primary service.       Electronically signed by Ja Joyner MD, 1/3/2023, 07:28 EST.

## 2023-01-03 NOTE — PROGRESS NOTES
[facility]   Hospitalist Progress Note    Date of admission: 12/31/2022  Patient Name: Delvis Boston  1965  Date: 1/3/2023      Subjective     Chief Complaint   Patient presents with   • Shortness of Breath       Summary: 57 y.o. Presented with worsening shortness of air found to have COPD exacerbation and left lower lobe nodule concerning for malignancy.  Pulmonology assisting patient undergoing bronchoscopy for further evaluation given concern for malignancy on 1/4/2023.  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      Interval Followup: Discussed at length about risk and benefit of bronchoscopy and given his medical history and persistent respiratory concerns and desire to pursue oncologic treatment if this is a malignancy.  Patient agrees to undergo bronchoscopy tomorrow.  Still short of air.  Having difficulty clearing secretions.  Quit smoking 15 years ago currently dips.  Did not use BiPAP last night but is willing to try.      Objective     Vitals:   Temp:  [97.3 °F (36.3 °C)-98.1 °F (36.7 °C)] 98.1 °F (36.7 °C)  Heart Rate:  [74-92] 89  Resp:  [18-20] 18  BP: (128-141)/(70-86) 137/82  Flow (L/min):  [2] 2    Physical Exam  Poor aeration bilaterally diminished in bases, some expiratory wheezing upper lung fields.  Conversational dyspnea    Result Review:  Vital signs, labs and recent relevant imaging reviewed.      arformoterol, 15 mcg, Nebulization, BID - RT  budesonide, 0.5 mg, Nebulization, BID - RT  doxycycline, 100 mg, Oral, Q12H  enoxaparin, 40 mg, Subcutaneous, Q24H  guaiFENesin, 1,200 mg, Oral, Q12H  levothyroxine, 100 mcg, Oral, Q AM  lisinopril, 20 mg, Oral, Daily  melatonin, 10 mg, Oral, Nightly  methylPREDNISolone sodium succinate, 40 mg, Intravenous, Q8H  pantoprazole, 40 mg, Oral, Q AM        •  albuterol  •  ALPRAZolam  •  benzonatate  •   HYDROcod Polst-CPM Polst ER  •  hydrOXYzine  •  ipratropium-albuterol  •  ondansetron  •  sodium chloride      Assessment / Plan     Assessment/Plan:  COPD with acute exacerbation, on room air baseline  Acute hypoxemic respiratory failure  1.5 cm left lower lobe nodule concerning for malignancy  History of heavy tobacco smoking quit about excellently 15 years ago  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 after midnight, bronchoscopy pending for tomorrow patient ultimately agreeable after extended discussion.  - Discussed with pulmonology, appreciate assistance, bronchoscopy tomorrow  - Outpatient PFTs and alpha-1 antitrypsin level required outpatient  - Try and NIPPV at night and with naps, RT  assisting, overnight pulse oximetry testing tomorrow for further evaluation  - IV steroids  -Continue anxiety medications  -Continue blood pressure medications  - Continue Synthroid at 100, TSH was elevated at 17, follow-up outpatient  - PPI  - Check a.m. labs    Continue inpatient monitoring and treatment as above.  Will need walk test prior to discharge.  On room air at baseline      DVT prophylaxis:  Medical DVT prophylaxis orders are present.    Code Status (Patient has no pulse and is not breathing): CPR (Attempt to Resuscitate)  Medical Interventions (Patient has pulse or is breathing): Full Support        CBC    CBC 12/31/22 1/1/23 1/3/23   WBC 9.27 8.13 19.40 (A)   RBC 5.53 5.62 5.31   Hemoglobin 15.6 16.1 15.3   Hematocrit 48.2 50.3 48.4   MCV 87.2 89.5 91.1   MCH 28.2 28.6 28.8   MCHC 32.4 32.0 31.6   RDW 13.4 13.2 13.4   Platelets 190 191 241   (A) Abnormal value              CMP    CMP 12/31/22 1/1/23 1/3/23 1/3/23      0540 0540   Glucose 101 (A) 184 (A)  168 (A)   BUN 9 11  12   Creatinine  0.85 0.83  0.71 (A)   eGFR 100.1 100.8  105.7   Sodium 136 136  140   Potassium 4.3 5.2  4.4   Chloride 98 98  99   Calcium 9.3 9.6  9.3   Total Protein 7.1  6.5    Albumin 4.0  3.5    Globulin 3.1      Total Bilirubin 0.5  <0.2    Alkaline Phosphatase 74  75    AST (SGOT) 17  13    ALT (SGPT) 15  17    Albumin/Globulin Ratio 1.3      BUN/Creatinine Ratio 10.6 13.3  16.9   Anion Gap 7.2 11.0  3.6 (A)   (A) Abnormal value       Comments are available for some flowsheets but are not being displayed.             Dispo: pending stability in clinical condition and appropriate discharge location.

## 2023-01-03 NOTE — PLAN OF CARE
Goal Outcome Evaluation:  Plan of Care Reviewed With: patient    Patient has been up most of the night, scheduled xanax were given for his anxiety  Will continue to monitor.

## 2023-01-04 ENCOUNTER — APPOINTMENT (OUTPATIENT)
Dept: GENERAL RADIOLOGY | Facility: HOSPITAL | Age: 58
DRG: 193 | End: 2023-01-04
Payer: MEDICARE

## 2023-01-04 ENCOUNTER — ANESTHESIA EVENT (OUTPATIENT)
Dept: GASTROENTEROLOGY | Facility: HOSPITAL | Age: 58
DRG: 193 | End: 2023-01-04
Payer: MEDICARE

## 2023-01-04 ENCOUNTER — ANESTHESIA (OUTPATIENT)
Dept: GASTROENTEROLOGY | Facility: HOSPITAL | Age: 58
DRG: 193 | End: 2023-01-04
Payer: MEDICARE

## 2023-01-04 LAB
ACB CMPLX DNA BAL NAA+NON-PRB-NCNCRNG: NOT DETECTED
ANION GAP SERPL CALCULATED.3IONS-SCNC: 2.6 MMOL/L (ref 5–15)
BASOPHILS # BLD AUTO: 0.11 10*3/MM3 (ref 0–0.2)
BASOPHILS NFR BLD AUTO: 0.7 % (ref 0–1.5)
BH CV ECHO MEAS - AO ROOT DIAM: 2.7 CM
BH CV ECHO MEAS - EDV(MOD-SP2): 63.1 ML
BH CV ECHO MEAS - EDV(MOD-SP4): 71.5 ML
BH CV ECHO MEAS - EF(MOD-BP): 65 %
BH CV ECHO MEAS - ESV(MOD-SP2): 22.1 ML
BH CV ECHO MEAS - ESV(MOD-SP4): 24.8 ML
BH CV ECHO MEAS - IVSD: 1 CM
BH CV ECHO MEAS - LA A2CS (ATRIAL LENGTH): 5.6 CM
BH CV ECHO MEAS - LA A4C LENGTH: 5.3 CM
BH CV ECHO MEAS - LA DIMENSION: 3 CM
BH CV ECHO MEAS - LAT PEAK E' VEL: 8.3 CM/SEC
BH CV ECHO MEAS - LVIDD: 4.4 CM
BH CV ECHO MEAS - LVIDS: 2.8 CM
BH CV ECHO MEAS - LVOT DIAM: 2 CM
BH CV ECHO MEAS - LVPWD: 1 CM
BH CV ECHO MEAS - MED PEAK E' VEL: 10.1 CM/SEC
BH CV ECHO MEAS - MV A MAX VEL: 82.3 CM/SEC
BH CV ECHO MEAS - MV DEC TIME: 264 MSEC
BH CV ECHO MEAS - MV E MAX VEL: 80.1 CM/SEC
BH CV ECHO MEAS - MV E/A: 1
BH CV ECHO MEAS - RVDD: 4.1 CM
BH CV ECHO MEASUREMENTS AVERAGE E/E' RATIO: 8.71
BLACTX-M ISLT/SPM QL: ABNORMAL
BLAIMP ISLT/SPM QL: ABNORMAL
BLAKPC ISLT/SPM QL: ABNORMAL
BLAOXA-48-LIKE ISLT/SPM QL: ABNORMAL
BLAVIM ISLT/SPM QL: ABNORMAL
BUN SERPL-MCNC: 13 MG/DL (ref 6–20)
BUN/CREAT SERPL: 18.8 (ref 7–25)
C PNEUM DNA NPH QL NAA+NON-PROBE: NOT DETECTED
CALCIUM SPEC-SCNC: 9.5 MG/DL (ref 8.6–10.5)
CHLORIDE SERPL-SCNC: 96 MMOL/L (ref 98–107)
CO2 SERPL-SCNC: 40.4 MMOL/L (ref 22–29)
CREAT SERPL-MCNC: 0.69 MG/DL (ref 0.76–1.27)
DEPRECATED RDW RBC AUTO: 44.8 FL (ref 37–54)
E CLOAC COMP DNA BAL NAA+NON-PRB-NCNCRNG: NOT DETECTED
E COLI DNA BAL NAA+NON-PRB-NCNCRNG: NOT DETECTED
EGFRCR SERPLBLD CKD-EPI 2021: 106.6 ML/MIN/1.73
EOSINOPHIL # BLD AUTO: 0 10*3/MM3 (ref 0–0.4)
EOSINOPHIL NFR BLD AUTO: 0 % (ref 0.3–6.2)
ERYTHROCYTE [DISTWIDTH] IN BLOOD BY AUTOMATED COUNT: 13.2 % (ref 12.3–15.4)
FLUAV SUBTYP SPEC NAA+PROBE: NOT DETECTED
FLUBV RNA ISLT QL NAA+PROBE: NOT DETECTED
GLUCOSE SERPL-MCNC: 159 MG/DL (ref 65–99)
GP B STREP DNA BAL NAA+NON-PRB-NCNCRNG: NOT DETECTED
HADV DNA SPEC NAA+PROBE: NOT DETECTED
HAEM INFLU DNA BAL NAA+NON-PRB-NCNCRNG: DETECTED
HCOV RNA LOWER RESP QL NAA+NON-PROBE: NOT DETECTED
HCT VFR BLD AUTO: 49.9 % (ref 37.5–51)
HGB BLD-MCNC: 15.4 G/DL (ref 13–17.7)
HMPV RNA NPH QL NAA+NON-PROBE: DETECTED
HPIV RNA LOWER RESP QL NAA+NON-PROBE: DETECTED
IMM GRANULOCYTES # BLD AUTO: 0.29 10*3/MM3 (ref 0–0.05)
IMM GRANULOCYTES NFR BLD AUTO: 1.7 % (ref 0–0.5)
IVRT: 56 MSEC
K AEROGENES DNA BAL NAA+NON-PRB-NCNCRNG: NOT DETECTED
K OXYTOCA DNA BAL NAA+NON-PRB-NCNCRNG: NOT DETECTED
K PNEU GRP DNA BAL NAA+NON-PRB-NCNCRNG: NOT DETECTED
L PNEUMO DNA LOWER RESP QL NAA+NON-PROBE: NOT DETECTED
LEFT ATRIUM VOLUME INDEX: 16.2 ML/M2
LEFT ATRIUM VOLUME: 32 ML
LYMPHOCYTES # BLD AUTO: 1.57 10*3/MM3 (ref 0.7–3.1)
LYMPHOCYTES NFR BLD AUTO: 9.3 % (ref 19.6–45.3)
LYMPHOCYTES NFR FLD MANUAL: 7 %
M CATARRHALIS DNA BAL NAA+NON-PRB-NCNCRNG: NOT DETECTED
M PNEUMO IGG SER IA-ACNC: NOT DETECTED
MACROPHAGE FLUID: 5 %
MAXIMAL PREDICTED HEART RATE: 161 BPM
MCH RBC QN AUTO: 28.3 PG (ref 26.6–33)
MCHC RBC AUTO-ENTMCNC: 30.9 G/DL (ref 31.5–35.7)
MCV RBC AUTO: 91.7 FL (ref 79–97)
MECA+MECC ISLT/SPM QL: ABNORMAL
MONOCYTES # BLD AUTO: 1.07 10*3/MM3 (ref 0.1–0.9)
MONOCYTES NFR BLD AUTO: 6.3 % (ref 5–12)
NDM GENE: ABNORMAL
NEUTROPHILS NFR BLD AUTO: 13.88 10*3/MM3 (ref 1.7–7)
NEUTROPHILS NFR BLD AUTO: 82 % (ref 42.7–76)
NEUTROPHILS NFR FLD MANUAL: 88 %
NRBC BLD AUTO-RTO: 0 /100 WBC (ref 0–0.2)
P AERUGINOSA DNA BAL NAA+NON-PRB-NCNCRNG: NOT DETECTED
PLATELET # BLD AUTO: 216 10*3/MM3 (ref 140–450)
PMV BLD AUTO: 10.1 FL (ref 6–12)
POTASSIUM SERPL-SCNC: 4.8 MMOL/L (ref 3.5–5.2)
PROCALCITONIN SERPL-MCNC: 0.05 NG/ML (ref 0–0.25)
PROTEUS SP DNA BAL NAA+NON-PRB-NCNCRNG: NOT DETECTED
RBC # BLD AUTO: 5.44 10*6/MM3 (ref 4.14–5.8)
RHINOVIRUS RNA SPEC NAA+PROBE: NOT DETECTED
RSV RNA NPH QL NAA+NON-PROBE: NOT DETECTED
S AUREUS DNA BAL NAA+NON-PRB-NCNCRNG: NOT DETECTED
S MARCESCENS DNA BAL NAA+NON-PRB-NCNCRNG: NOT DETECTED
S PNEUM DNA BAL NAA+NON-PRB-NCNCRNG: NOT DETECTED
S PYO DNA BAL NAA+NON-PRB-NCNCRNG: NOT DETECTED
SODIUM SERPL-SCNC: 139 MMOL/L (ref 136–145)
STRESS TARGET HR: 137 BPM
VISUAL PRESENCE OF BLOOD: YES
WBC NRBC COR # BLD: 16.92 10*3/MM3 (ref 3.4–10.8)

## 2023-01-04 PROCEDURE — 80048 BASIC METABOLIC PNL TOTAL CA: CPT | Performed by: INTERNAL MEDICINE

## 2023-01-04 PROCEDURE — 87116 MYCOBACTERIA CULTURE: CPT | Performed by: INTERNAL MEDICINE

## 2023-01-04 PROCEDURE — 88108 CYTOPATH CONCENTRATE TECH: CPT | Performed by: INTERNAL MEDICINE

## 2023-01-04 PROCEDURE — 89051 BODY FLUID CELL COUNT: CPT | Performed by: INTERNAL MEDICINE

## 2023-01-04 PROCEDURE — 0BDM8ZX EXTRACTION OF BILATERAL LUNGS, VIA NATURAL OR ARTIFICIAL OPENING ENDOSCOPIC, DIAGNOSTIC: ICD-10-PCS | Performed by: INTERNAL MEDICINE

## 2023-01-04 PROCEDURE — 25010000002 PROPOFOL 10 MG/ML EMULSION: Performed by: NURSE ANESTHETIST, CERTIFIED REGISTERED

## 2023-01-04 PROCEDURE — 25010000002 ENOXAPARIN PER 10 MG: Performed by: INTERNAL MEDICINE

## 2023-01-04 PROCEDURE — 99233 SBSQ HOSP IP/OBS HIGH 50: CPT | Performed by: INTERNAL MEDICINE

## 2023-01-04 PROCEDURE — 31645 BRNCHSC W/THER ASPIR 1ST: CPT | Performed by: INTERNAL MEDICINE

## 2023-01-04 PROCEDURE — 25010000002 METHYLPREDNISOLONE PER 40 MG: Performed by: FAMILY MEDICINE

## 2023-01-04 PROCEDURE — 94760 N-INVAS EAR/PLS OXIMETRY 1: CPT

## 2023-01-04 PROCEDURE — 87206 SMEAR FLUORESCENT/ACID STAI: CPT | Performed by: INTERNAL MEDICINE

## 2023-01-04 PROCEDURE — 94664 DEMO&/EVAL PT USE INHALER: CPT

## 2023-01-04 PROCEDURE — 87205 SMEAR GRAM STAIN: CPT | Performed by: INTERNAL MEDICINE

## 2023-01-04 PROCEDURE — 31624 DX BRONCHOSCOPE/LAVAGE: CPT | Performed by: INTERNAL MEDICINE

## 2023-01-04 PROCEDURE — 71045 X-RAY EXAM CHEST 1 VIEW: CPT

## 2023-01-04 PROCEDURE — 94762 N-INVAS EAR/PLS OXIMTRY CONT: CPT

## 2023-01-04 PROCEDURE — 94799 UNLISTED PULMONARY SVC/PX: CPT

## 2023-01-04 PROCEDURE — 85025 COMPLETE CBC W/AUTO DIFF WBC: CPT | Performed by: INTERNAL MEDICINE

## 2023-01-04 PROCEDURE — 84145 PROCALCITONIN (PCT): CPT | Performed by: INTERNAL MEDICINE

## 2023-01-04 PROCEDURE — 87102 FUNGUS ISOLATION CULTURE: CPT | Performed by: INTERNAL MEDICINE

## 2023-01-04 PROCEDURE — 87071 CULTURE AEROBIC QUANT OTHER: CPT | Performed by: INTERNAL MEDICINE

## 2023-01-04 PROCEDURE — 25010000002 METHYLPREDNISOLONE PER 40 MG: Performed by: INTERNAL MEDICINE

## 2023-01-04 PROCEDURE — 0B9J8ZX DRAINAGE OF LEFT LOWER LUNG LOBE, VIA NATURAL OR ARTIFICIAL OPENING ENDOSCOPIC, DIAGNOSTIC: ICD-10-PCS | Performed by: INTERNAL MEDICINE

## 2023-01-04 PROCEDURE — 94761 N-INVAS EAR/PLS OXIMETRY MLT: CPT

## 2023-01-04 PROCEDURE — 87070 CULTURE OTHR SPECIMN AEROBIC: CPT | Performed by: INTERNAL MEDICINE

## 2023-01-04 PROCEDURE — 88312 SPECIAL STAINS GROUP 1: CPT | Performed by: INTERNAL MEDICINE

## 2023-01-04 PROCEDURE — 87633 RESP VIRUS 12-25 TARGETS: CPT | Performed by: INTERNAL MEDICINE

## 2023-01-04 RX ORDER — AMOXICILLIN AND CLAVULANATE POTASSIUM 875; 125 MG/1; MG/1
1 TABLET, FILM COATED ORAL EVERY 12 HOURS SCHEDULED
Status: DISCONTINUED | OUTPATIENT
Start: 2023-01-04 | End: 2023-01-06 | Stop reason: HOSPADM

## 2023-01-04 RX ORDER — LIDOCAINE HYDROCHLORIDE 20 MG/ML
INJECTION, SOLUTION EPIDURAL; INFILTRATION; INTRACAUDAL; PERINEURAL AS NEEDED
Status: DISCONTINUED | OUTPATIENT
Start: 2023-01-04 | End: 2023-01-04 | Stop reason: SURG

## 2023-01-04 RX ORDER — MAGNESIUM HYDROXIDE 1200 MG/15ML
LIQUID ORAL AS NEEDED
Status: DISCONTINUED | OUTPATIENT
Start: 2023-01-04 | End: 2023-01-04 | Stop reason: HOSPADM

## 2023-01-04 RX ORDER — PROPOFOL 10 MG/ML
VIAL (ML) INTRAVENOUS AS NEEDED
Status: DISCONTINUED | OUTPATIENT
Start: 2023-01-04 | End: 2023-01-04 | Stop reason: SURG

## 2023-01-04 RX ORDER — SODIUM CHLORIDE, SODIUM LACTATE, POTASSIUM CHLORIDE, CALCIUM CHLORIDE 600; 310; 30; 20 MG/100ML; MG/100ML; MG/100ML; MG/100ML
30 INJECTION, SOLUTION INTRAVENOUS CONTINUOUS
Status: DISCONTINUED | OUTPATIENT
Start: 2023-01-04 | End: 2023-01-05

## 2023-01-04 RX ORDER — POLYETHYLENE GLYCOL 3350 17 G/17G
17 POWDER, FOR SOLUTION ORAL 2 TIMES DAILY
Status: DISCONTINUED | OUTPATIENT
Start: 2023-01-04 | End: 2023-01-06 | Stop reason: HOSPADM

## 2023-01-04 RX ORDER — LIDOCAINE HYDROCHLORIDE 40 MG/ML
INJECTION, SOLUTION RETROBULBAR; TOPICAL AS NEEDED
Status: DISCONTINUED | OUTPATIENT
Start: 2023-01-04 | End: 2023-01-04 | Stop reason: HOSPADM

## 2023-01-04 RX ADMIN — METHYLPREDNISOLONE SODIUM SUCCINATE 40 MG: 40 INJECTION, POWDER, FOR SOLUTION INTRAMUSCULAR; INTRAVENOUS at 17:33

## 2023-01-04 RX ADMIN — Medication 10 MG: at 20:36

## 2023-01-04 RX ADMIN — ARFORMOTEROL TARTRATE 15 MCG: 15 SOLUTION RESPIRATORY (INHALATION) at 07:45

## 2023-01-04 RX ADMIN — DOXYCYCLINE 100 MG: 100 CAPSULE ORAL at 20:36

## 2023-01-04 RX ADMIN — IPRATROPIUM BROMIDE AND ALBUTEROL SULFATE 3 ML: .5; 3 SOLUTION RESPIRATORY (INHALATION) at 13:55

## 2023-01-04 RX ADMIN — ENOXAPARIN SODIUM 40 MG: 100 INJECTION SUBCUTANEOUS at 17:33

## 2023-01-04 RX ADMIN — SODIUM CHLORIDE, POTASSIUM CHLORIDE, SODIUM LACTATE AND CALCIUM CHLORIDE: 600; 310; 30; 20 INJECTION, SOLUTION INTRAVENOUS at 12:42

## 2023-01-04 RX ADMIN — METHYLPREDNISOLONE SODIUM SUCCINATE 40 MG: 40 INJECTION, POWDER, FOR SOLUTION INTRAMUSCULAR; INTRAVENOUS at 02:22

## 2023-01-04 RX ADMIN — ALPRAZOLAM 0.5 MG: 0.25 TABLET ORAL at 21:32

## 2023-01-04 RX ADMIN — PROPOFOL 50 MG: 10 INJECTION, EMULSION INTRAVENOUS at 12:47

## 2023-01-04 RX ADMIN — BUDESONIDE 0.5 MG: 0.5 INHALANT ORAL at 07:45

## 2023-01-04 RX ADMIN — DOXYCYCLINE 100 MG: 100 CAPSULE ORAL at 10:14

## 2023-01-04 RX ADMIN — BUDESONIDE 0.5 MG: 0.5 INHALANT ORAL at 18:41

## 2023-01-04 RX ADMIN — LISINOPRIL 20 MG: 20 TABLET ORAL at 10:16

## 2023-01-04 RX ADMIN — GUAIFENESIN 1200 MG: 600 TABLET ORAL at 20:36

## 2023-01-04 RX ADMIN — LIDOCAINE HYDROCHLORIDE 100 MG: 20 INJECTION, SOLUTION EPIDURAL; INFILTRATION; INTRACAUDAL; PERINEURAL at 12:47

## 2023-01-04 RX ADMIN — ALPRAZOLAM 0.5 MG: 0.25 TABLET ORAL at 15:12

## 2023-01-04 RX ADMIN — ARFORMOTEROL TARTRATE 15 MCG: 15 SOLUTION RESPIRATORY (INHALATION) at 18:40

## 2023-01-04 RX ADMIN — GUAIFENESIN 1200 MG: 600 TABLET ORAL at 10:15

## 2023-01-04 RX ADMIN — AMOXICILLIN AND CLAVULANATE POTASSIUM 1 TABLET: 875; 125 TABLET, FILM COATED ORAL at 10:14

## 2023-01-04 RX ADMIN — METHYLPREDNISOLONE SODIUM SUCCINATE 40 MG: 40 INJECTION, POWDER, FOR SOLUTION INTRAMUSCULAR; INTRAVENOUS at 10:16

## 2023-01-04 RX ADMIN — AMOXICILLIN AND CLAVULANATE POTASSIUM 1 TABLET: 875; 125 TABLET, FILM COATED ORAL at 21:33

## 2023-01-04 RX ADMIN — PROPOFOL 200 MCG/KG/MIN: 10 INJECTION, EMULSION INTRAVENOUS at 12:47

## 2023-01-04 NOTE — PLAN OF CARE
Goal Outcome Evaluation:  Plan of Care Reviewed With: patient        Progress: no change Pt has been resting throughout the night, Pt has refused his bath this morning prior to his procedure this morning, will continue to monitor on this shift.

## 2023-01-04 NOTE — ANESTHESIA POSTPROCEDURE EVALUATION
Patient: Delvis Boston    Procedure Summary     Date: 01/04/23 Room / Location: [facility] ENDOSCOPY 3 / [facility] ENDOSCOPY    Anesthesia Start: 1242 Anesthesia Stop: 1327    Procedure: BRONCHOSCOPY WITH BRONCHOALVEOLAR LAVAGE, BRONCHIAL WASHINGS (Bronchus) Diagnosis:       Acute exacerbation of chronic obstructive pulmonary disease (COPD) (HCC)      Hypoxia      COPD exacerbation (HCC)      (Acute exacerbation of chronic obstructive pulmonary disease (COPD) (HCC) [J44.1])      (Hypoxia [R09.02])      (COPD exacerbation (HCC) [J44.1])    Surgeons: Ja Joyner MD Provider: Delvis Ramsay MD    Anesthesia Type: general ASA Status: 3          Anesthesia Type: general    Vitals  Vitals Value Taken Time   /73 01/04/23 1328   Temp     Pulse 107 01/04/23 1331   Resp     SpO2 97 % 01/04/23 1331   Vitals shown include unvalidated device data.        Post Anesthesia Care and Evaluation    Patient location during evaluation: bedside  Patient participation: complete - patient participated  Level of consciousness: awake and alert  Pain management: adequate    Airway patency: patent  Anesthetic complications: No anesthetic complications  PONV Status: none  Cardiovascular status: acceptable  Respiratory status: acceptable  Hydration status: acceptable    Comments: An Anesthesiologist personally participated in the most demanding procedures (including induction and emergence if applicable) in the anesthesia plan, monitored the course of anesthesia administration at frequent intervals and remained physically present and available for immediate diagnosis and treatment of emergencies.

## 2023-01-04 NOTE — ANESTHESIA PREPROCEDURE EVALUATION
Anesthesia Evaluation     Patient summary reviewed and Nursing notes reviewed                Airway   Mallampati: I  TM distance: >3 FB  Neck ROM: full  No difficulty expected  Dental      Pulmonary    (+) COPD, wheezes,   Cardiovascular - negative cardio ROS and normal exam    Rhythm: regular  Rate: normal        Neuro/Psych- negative ROS  GI/Hepatic/Renal/Endo - negative ROS     Musculoskeletal (-) negative ROS    Abdominal    Substance History - negative use     OB/GYN negative ob/gyn ROS         Other                        Anesthesia Plan    ASA 3     general     intravenous induction     Anesthetic plan, risks, benefits, and alternatives have been provided, discussed and informed consent has been obtained with: patient.        CODE STATUS:    Code Status (Patient has no pulse and is not breathing): CPR (Attempt to Resuscitate)  Medical Interventions (Patient has pulse or is breathing): Full Support

## 2023-01-04 NOTE — PLAN OF CARE
Goal Outcome Evaluation:  Plan of Care Reviewed With: patient        Progress: no change  Outcome Evaluation: Patient currently on 2Liters NC, remains short of air when having a conversation. Occasional anxiety and treated as ordered. VSS. Patient is scheduled for a bronch tomorrow, discussed consent form with patient which he has not signed yet at this time. Continue to monitor.

## 2023-01-04 NOTE — OP NOTE
Bronchoscopy Procedure Note    Procedure:  Bronchoscopy with mucous plug removal  Bronchoscopy with bronchoalveolar lavage left lower lobe  Bronchoscopy with bronchial washings tracheobronchial tree  Airway inspection    Pre-Operative Diagnosis: Persistent cough, not able to clear secretions, COPD exacerbation  Post-Operative Diagnosis: Significant mucus plugging, COPD with acute exacerbation    Indication:  Mucous plugging, difficulty with airway clearance, COPD exacerbation, persistent cough    Anesthesia: MAC anesthesia    Procedure Details: Patient was consented for the procedure with all risks and benefits of the procedure explained in detail. Patient was given the opportunity to ask questions and all concerns were answered.    The bronchoscope was inserted into the main airway via the mouth. An anatomical survey was done of the main airways and the subsegmental bronchus. The findings are reported below.  Bilateral significant mucus plugging was seen, more so in left and right lower lobes, suctioned out in multiple attempts.  A bronchoalveolar lavage was performed using 60 mL aliquots of normal saline x1 instilled into the airways then aspirated back from left lower lobe of lung with 50 mL serosanguineous fluid in return. Patient had persistent oozing after the procedure, subsided prior to removing the scope.    Findings:  Bilateral significant mucous plug seen, more so in left and right lower lobes, suctioned out in multiple attempts  Friable mucosa, easy bleeding with suction  Scattered purulent secretions    Estimated Blood Loss: Insignificant    Specimens:  BAL left lower lobe  Bronchial washings tracheobronchial tree    Complications: None; patient tolerated the procedure well.    Disposition: To floor, once stable in recovery.    Patient tolerated the procedure well.      Electronically signed by Ja Joyner MD, 1/4/2023, 12:58 EST.

## 2023-01-04 NOTE — CASE MANAGEMENT/SOCIAL WORK
RT CM followed up with Mr Boston to discuss bipap use.  Mr Boston has not yet worn the bipap.  He states no one has offered to put it on him.       Bronchoscopy to be done today and patient is not very engaged in conversation; will follow up again tomorrow.

## 2023-01-04 NOTE — PLAN OF CARE
Problem: Adult Inpatient Plan of Care  Goal: Plan of Care Review  Outcome: Ongoing, Progressing  Flowsheets (Taken 1/4/2023 1526)  Progress: no change  Plan of Care Reviewed With: patient  Outcome Evaluation: Patient had bronchoscopy today, patient doing well at this time. Patient had complaints of anxiety, PO xanax given. Patient has no other complaints at this time.  Goal: Patient-Specific Goal (Individualized)  Outcome: Ongoing, Progressing  Goal: Absence of Hospital-Acquired Illness or Injury  Outcome: Ongoing, Progressing  Intervention: Identify and Manage Fall Risk  Recent Flowsheet Documentation  Taken 1/4/2023 1513 by Pearl Luna RN  Safety Promotion/Fall Prevention: safety round/check completed  Taken 1/4/2023 1430 by Pearl Luna RN  Safety Promotion/Fall Prevention: safety round/check completed  Taken 1/4/2023 1120 by Pearl Luna RN  Safety Promotion/Fall Prevention: safety round/check completed  Taken 1/4/2023 1020 by Pearl Luna RN  Safety Promotion/Fall Prevention: safety round/check completed  Taken 1/4/2023 1015 by Pearl Luna RN  Safety Promotion/Fall Prevention: safety round/check completed  Taken 1/4/2023 0950 by Pearl Luna RN  Safety Promotion/Fall Prevention: safety round/check completed  Taken 1/4/2023 0730 by Pearl Luna RN  Safety Promotion/Fall Prevention:   assistive device/personal items within reach   clutter free environment maintained   fall prevention program maintained   nonskid shoes/slippers when out of bed   room organization consistent   safety round/check completed  Intervention: Prevent Infection  Recent Flowsheet Documentation  Taken 1/4/2023 0730 by Pearl Luna RN  Infection Prevention:   cohorting utilized   environmental surveillance performed   hand hygiene promoted   single patient room provided  Goal: Optimal Comfort and Wellbeing  Outcome: Ongoing, Progressing  Intervention: Provide Person-Centered Care  Recent Flowsheet  Documentation  Taken 1/4/2023 1015 by Pearl Luna RN  Trust Relationship/Rapport:   care explained   choices provided   emotional support provided   empathic listening provided   questions answered   questions encouraged   reassurance provided   thoughts/feelings acknowledged  Goal: Readiness for Transition of Care  Outcome: Ongoing, Progressing     Problem: Behavioral Health Comorbidity  Goal: Maintenance of Behavioral Health Symptom Control  Outcome: Ongoing, Progressing  Intervention: Maintain Behavioral Health Symptom Control  Recent Flowsheet Documentation  Taken 1/4/2023 0730 by Pearl Luna RN  Medication Review/Management:   medications reviewed   high-risk medications identified     Problem: COPD (Chronic Obstructive Pulmonary Disease) Comorbidity  Goal: Maintenance of COPD Symptom Control  Outcome: Ongoing, Progressing  Intervention: Maintain COPD-Symptom Control  Recent Flowsheet Documentation  Taken 1/4/2023 0730 by Pearl Luna RN  Medication Review/Management:   medications reviewed   high-risk medications identified     Problem: Hypertension Comorbidity  Goal: Blood Pressure in Desired Range  Outcome: Ongoing, Progressing  Intervention: Maintain Blood Pressure Management  Recent Flowsheet Documentation  Taken 1/4/2023 0730 by Pearl Luna RN  Medication Review/Management:   medications reviewed   high-risk medications identified   Goal Outcome Evaluation:  Plan of Care Reviewed With: patient        Progress: no change  Outcome Evaluation: Patient had bronchoscopy today, patient doing well at this time. Patient had complaints of anxiety, PO xanax given. Patient has no other complaints at this time.

## 2023-01-04 NOTE — PROGRESS NOTES
Pulmonary / Critical Care Progress Note      Patient Name: Delvis Boston  : 1965  MRN: [id number]  Primary Care Physician:  Margie Garcia APRN  Date of admission: 2022    Subjective   Subjective   Follow-up for COPD exacerbation, left lower lobe lung nodule, respiratory failure.     Over past 24 hours: remained on nebs- brovana, pulmicort, IV solu-medrol 40 IV q8 hrs, protonix 40 daily, doxy bid. RT  on board.  Continued on anxiolytics.  Still did not use BiPAP last night.    No acute events overnight.     This morning,   Continues to have cough.  Not able to get any secretions.  Has wheezing, not able to breathe as much  Has wheezing, chest tightness.  No fever or chills.  No nausea or vomiting.  Still has chest tightness.  Agreeable for bronchoscopy    Review of Systems  General:  Fatigue, No Fever  HEENT: No dysphagia, No Visual Changes, no rhinorrhea  Respiratory:  + cough,+Dyspnea, no phlegm, No Pleuritic Pain, + wheezing, no hemoptysis  Cardiovascular: Denies chest pain, denies palpitations,+RIVERA, No Chest Pressure  Gastrointestinal:  No Abdominal Pain, No Nausea, No Vomiting, No Diarrhea  Genitourinary:  No Dysuria, No Frequency, No Hesitancy  Musculoskeletal: No muscle pain or swelling  Endocrine:  No Heat Intolerance, No Cold Intolerance  Neurologic:  No Confusion, no Dysarthria, No Headaches  Skin:  No Rash, No Open Wounds          Objective   Objective     Vitals:   Temp:  [97.4 °F (36.3 °C)-98.1 °F (36.7 °C)] 97.4 °F (36.3 °C)  Heart Rate:  [85-93] 88  Resp:  [18-20] 20  BP: (128-141)/(62-82) 135/62  Flow (L/min):  [2] 2     Physical Exam   Vital Signs Reviewed   General:  WDWN, Alert, has mild conversational dyspnea.    HEENT:  PERRL, EOMI.  OP, nares clear, no sinus tenderness  Neck:  Supple, no JVD, no thyromegaly  Chest:  good aeration, expiratory wheezing bilaterally, no crackles or rhonchi, tympanic to percussion bilaterally, no work of breathing noted  CV: RRR, no  MGR, pulses 2+, equal.  Abd:  Soft, NT, ND, + BS, no HSM  EXT:  no clubbing, no cyanosis, no edema  Neuro:  A&Ox3, CN grossly intact, no focal deficits.  Skin: No rashes or lesions noted      Result Review    Result Review:  I have personally reviewed the results from the time of this admission to 1/4/2023 07:34 EST and agree with these findings:  [x]  Laboratory  [x]  Microbiology  [x]  Radiology  [x]  EKG/Telemetry   [x]  Cardiology/Vascular   []  Pathology  []  Old records  []  Other:  Most notable findings include:         Lab 01/04/23  0548 01/03/23  0540 01/01/23  0638 12/31/22  1235   WBC 16.92* 19.40* 8.13 9.27   HEMOGLOBIN 15.4 15.3 16.1 15.6   HEMATOCRIT 49.9 48.4 50.3 48.2   PLATELETS 216 241 191 190   SODIUM 139 140 136 136   POTASSIUM 4.8 4.4 5.2 4.3   CHLORIDE 96* 99 98 98   CO2 40.4* 37.4* 27.0 30.8*   BUN 13 12 11 9   CREATININE 0.69* 0.71* 0.83 0.85   GLUCOSE 159* 168* 184* 101*   CALCIUM 9.5 9.3 9.6 9.3   PHOSPHORUS  --  3.3 3.8  --    TOTAL PROTEIN  --  6.5  --  7.1   ALBUMIN  --  3.5  --  4.0   GLOBULIN  --   --   --  3.1     Resp culture negative.     CT Chest Without Contrast Diagnostic    Result Date: 1/1/2023  PROCEDURE: CT CHEST WO CONTRAST DIAGNOSTIC  COMPARISON: [facility], CR, XR CHEST 1 VW, 12/31/2022, 12:45.  [facility], CT, CHEST W/O CONTRAST, 10/08/2014, 12:36.  [facility], CT, ABDOMEN/PELVIS WITH CONTRAST, 9/18/2015, 13:31.  INDICATIONS: Respiratory illness, nondiagnostic xray  TECHNIQUE: CT images were created without the administration of contrast material.   PROTOCOL:   Standard imaging protocol performed    RADIATION:   DLP: 359.7 mGy*cm   Automated exposure control was utilized to minimize radiation dose.  FINDINGS:  Lung window images reveal marked emphysema.  Pleural thickening in the right hemithorax with calcification appear stable.  Scarring in the right lower lobe is unchanged.  1.5 cm ill-defined noncalcified nodule is seen  in the left lower lobe, well seen on series 202, image 99. This is suspicious for bronchogenic malignancy or lung cancer, a small bronchus is seen centrally.  Mediastinal windows reveal no mediastinal or axillary adenopathy.  Extensive coronary artery calcifications are evident.  Mild degenerative spurring is seen in the thoracic spine.       Impression:   CT scan of the chest with IV contrast demonstrating marked emphysema.  1.5 cm mass in the left lower lobe is suspicious for bronchogenic malignancy or lung cancer.     DANETTE KINCAID MD       Electronically Signed and Approved By: DANETTE KINCAID MD on 1/01/2023 at 9:42                 Assessment & Plan   Assessment / Plan     Active Hospital Problems:  Active Hospital Problems    Diagnosis    • **Acute exacerbation of chronic obstructive pulmonary disease (COPD) (HCC)    • COPD exacerbation (HCC)    • Hypoxia          Impression:  COPD with acute exacerbation  Left lower lobe pulmonary nodule  History of smoking in past    Plan:   CT chest with severe emphysema, left lower lobe pulmonary nodule  Continue Brovana and Pulmicort, DuoNeb.  Continue with IV Solu-Medrol 40 mg every 8 hours  Continue hydroxyzine and alprazolam for anxiety.  Clinically not improved with aggressive treatment medically.  Discussed bronchoscopy with bronchoalveolar lavage, bronchial washing, possible transbronchial biopsy, endobronchial brushing, airway inspection.  Risks and benefits of the procedure were discussed in detail.  Alternatives and options were reviewed.  Risks including pneumothorax, pneumonia, bleeding, respiratory depression and that it could be fatal were all reviewed.  Patient understands and is agreeable for the procedure.  Proceed with bronchoscopy today.  RT  is consulted.  Will need PFT, alpha-1 antitrypsin level and genetics as an outpatient  He will need NIPPV for home likely  Wean oxygen as tolerated.  We will consider BiPAP 12/5 with sleep tonight.   Check overnight oximetry tomorrow    DVT prophylaxis:  Medical DVT prophylaxis orders are present.    CODE STATUS:   Code Status (Patient has no pulse and is not breathing): CPR (Attempt to Resuscitate)  Medical Interventions (Patient has pulse or is breathing): Full Support    I personally reviewed pertinent labs, imaging and provider notes. Discussed with bedside nurse and will discuss with primary service.       Electronically signed by Ja Joyner MD, 1/4/2023, 07:34 EST.

## 2023-01-04 NOTE — PROGRESS NOTES
[facility]   Hospitalist Progress Note    Date of admission: 12/31/2022  Patient Name: Delvis Boston  1965  Date: 1/4/2023      Subjective     Chief Complaint   Patient presents with   • Shortness of Breath       Summary: 57 y.o. Presented with worsening shortness of air found to have COPD exacerbation and left lower lobe nodule concerning for malignancy.  Pulmonology assisting patient undergoing bronchoscopy for further evaluation given concern for malignancy on 1/4/2023.  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  Patient underwent bronchoscopy on 1/4 with notable mucus plugging and secretions, bronchial washings obtained.    Interval Followup: Still short of air may be slightly better.  Became anxious and had trouble with laying flat in bed.  No fevers.  No chest pain.      Objective     Vitals:   Temp:  [97 °F (36.1 °C)-98.3 °F (36.8 °C)] 98 °F (36.7 °C)  Heart Rate:  [78-97] 97  Resp:  [18-39] 24  BP: (119-192)/() 155/88  Flow (L/min):  [2-6] 2    Physical Exam  Slight diminished in bases, poor aeration, expiratory wheezing, slightly improved from yesterday conversational dyspnea on nasal cannula     Result Review:  Vital signs, labs and recent relevant imaging reviewed.      amoxicillin-clavulanate, 1 tablet, Oral, Q12H  arformoterol, 15 mcg, Nebulization, BID - RT  budesonide, 0.5 mg, Nebulization, BID - RT  doxycycline, 100 mg, Oral, Q12H  enoxaparin, 40 mg, Subcutaneous, Q24H  guaiFENesin, 1,200 mg, Oral, Q12H  levothyroxine, 100 mcg, Oral, Q AM  lisinopril, 20 mg, Oral, Daily  melatonin, 10 mg, Oral, Nightly  methylPREDNISolone sodium succinate, 40 mg, Intravenous, Q8H  pantoprazole, 40 mg, Oral, Q AM  polyethylene glycol, 17 g, Oral, BID        •  albuterol  •  ALPRAZolam  •  benzonatate  •  HYDROcod Polst-CPM Polst ER  •  hydrOXYzine  •   ipratropium-albuterol  •  ondansetron  •  sodium chloride      Assessment / Plan     Assessment/Plan:  COPD with acute exacerbation, on room air baseline  Acute hypoxemic respiratory failure  Haemophilus influenza pneumonia  Bilateral mucous plugging   Mild diastolic dysfunction with normal EF   1.5 cm left lower lobe nodule concerning for malignancy  History of heavy tobacco smoking quit about excellently 15 years ago  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  Likely need outpatient NIPPV.  Also need walk test prior to discharge as likely of at least 2 L oxygen requirement  -RT  assisting  - Continue respiratory hygiene, Brovana, Pulmicort, nebs  - Guaifenesin respiratory clearance measures inadequate for proving respirations  -Mild diastolic dysfunction with preserved EF on echo, BNP normal, no obvious fluid overload.  Evaluate for diuresis.  -Scheduled bowel regimen  - Outpatient PFTs and alpha-1 antitrypsin level required outpatient  - IV steroids  -Continue anxiety medications  -Continue blood pressure medications  - Continue Synthroid at 100, TSH was elevated at 17, follow-up outpatient may need further adjustment then.  - PPI  - f/u  a.m. labs  -Nursing noting acute respiratory distress following bronchoscopy with belly breathing labored respirations, stat repeat chest x-ray ordered, no pneumothorax noted, emphysematous changes but otherwise feels slightly improved from prior, notably the left lower lobe nodule was not obviously visualized on the portable film.    Continue inpatient monitoring and treatment as above.  Will need  walk test prior to discharge.  On room air at baseline      DVT prophylaxis:  Medical DVT prophylaxis orders are present.    Code Status (Patient has no pulse and is not breathing): CPR (Attempt to Resuscitate)  Medical Interventions (Patient has pulse or is breathing): Full Support        CBC    CBC 1/1/23 1/3/23 1/4/23   WBC 8.13 19.40 (A) 16.92 (A)   RBC 5.62 5.31 5.44   Hemoglobin 16.1 15.3 15.4   Hematocrit 50.3 48.4 49.9   MCV 89.5 91.1 91.7   MCH 28.6 28.8 28.3   MCHC 32.0 31.6 30.9 (A)   RDW 13.2 13.4 13.2   Platelets 191 241 216   (A) Abnormal value              CMP    CMP 1/1/23 1/3/23 1/3/23 1/4/23     0540 0540    Glucose 184 (A)  168 (A) 159 (A)   BUN 11  12 13   Creatinine 0.83  0.71 (A) 0.69 (A)   eGFR 100.8  105.7 106.6   Sodium 136  140 139   Potassium 5.2  4.4 4.8   Chloride 98  99 96 (A)   Calcium 9.6  9.3 9.5   Total Protein  6.5     Albumin  3.5     Total Bilirubin  <0.2     Alkaline Phosphatase  75     AST (SGOT)  13     ALT (SGPT)  17     BUN/Creatinine Ratio 13.3  16.9 18.8   Anion Gap 11.0  3.6 (A) 2.6 (A)   (A) Abnormal value       Comments are available for some flowsheets but are not being displayed.             Dispo: pending stability in clinical condition and appropriate discharge location.

## 2023-01-04 NOTE — NURSING NOTE
Spoke with nurse in Endo, asked if patient could have morning medications with sip of water and if lisinopril was okay to give. Endo nurse spoke with Dr. Isaac who states that it is okay to give all scheduled medications with sips of water.

## 2023-01-05 LAB
ANION GAP SERPL CALCULATED.3IONS-SCNC: 5.7 MMOL/L (ref 5–15)
BASOPHILS # BLD AUTO: 0.1 10*3/MM3 (ref 0–0.2)
BASOPHILS NFR BLD AUTO: 0.7 % (ref 0–1.5)
BUN SERPL-MCNC: 14 MG/DL (ref 6–20)
BUN/CREAT SERPL: 15.6 (ref 7–25)
CALCIUM SPEC-SCNC: 9.8 MG/DL (ref 8.6–10.5)
CHLAMYDIA IGG SER-ACNC: <0.91 RATIO (ref 0–0.9)
CHLORIDE SERPL-SCNC: 91 MMOL/L (ref 98–107)
CO2 SERPL-SCNC: 41.3 MMOL/L (ref 22–29)
CREAT SERPL-MCNC: 0.9 MG/DL (ref 0.76–1.27)
DEPRECATED RDW RBC AUTO: 42.7 FL (ref 37–54)
EGFRCR SERPLBLD CKD-EPI 2021: 98.4 ML/MIN/1.73
EOSINOPHIL # BLD AUTO: 0.01 10*3/MM3 (ref 0–0.4)
EOSINOPHIL NFR BLD AUTO: 0.1 % (ref 0.3–6.2)
ERYTHROCYTE [DISTWIDTH] IN BLOOD BY AUTOMATED COUNT: 12.8 % (ref 12.3–15.4)
GLUCOSE SERPL-MCNC: 190 MG/DL (ref 65–99)
HADV AB TITR SER CF: ABNORMAL {TITER}
HCT VFR BLD AUTO: 51.6 % (ref 37.5–51)
HGB BLD-MCNC: 16.3 G/DL (ref 13–17.7)
IMM GRANULOCYTES # BLD AUTO: 0.35 10*3/MM3 (ref 0–0.05)
IMM GRANULOCYTES NFR BLD AUTO: 2.6 % (ref 0–0.5)
L PNEUMO AB SER IA-ACNC: <0.91 OD RATIO (ref 0–0.9)
LYMPHOCYTES # BLD AUTO: 1.08 10*3/MM3 (ref 0.7–3.1)
LYMPHOCYTES NFR BLD AUTO: 8 % (ref 19.6–45.3)
M PNEUMO IGG SER IA-ACNC: 157 U/ML (ref 0–99)
M PNEUMO IGM SER IA-ACNC: <770 U/ML (ref 0–769)
MCH RBC QN AUTO: 28.6 PG (ref 26.6–33)
MCHC RBC AUTO-ENTMCNC: 31.6 G/DL (ref 31.5–35.7)
MCV RBC AUTO: 90.5 FL (ref 79–97)
MONOCYTES # BLD AUTO: 0.98 10*3/MM3 (ref 0.1–0.9)
MONOCYTES NFR BLD AUTO: 7.3 % (ref 5–12)
NEUTROPHILS NFR BLD AUTO: 10.96 10*3/MM3 (ref 1.7–7)
NEUTROPHILS NFR BLD AUTO: 81.3 % (ref 42.7–76)
NRBC BLD AUTO-RTO: 0.1 /100 WBC (ref 0–0.2)
PLATELET # BLD AUTO: 165 10*3/MM3 (ref 140–450)
PMV BLD AUTO: 10.7 FL (ref 6–12)
POTASSIUM SERPL-SCNC: 4.9 MMOL/L (ref 3.5–5.2)
RBC # BLD AUTO: 5.7 10*6/MM3 (ref 4.14–5.8)
SODIUM SERPL-SCNC: 138 MMOL/L (ref 136–145)
WBC NRBC COR # BLD: 13.48 10*3/MM3 (ref 3.4–10.8)

## 2023-01-05 PROCEDURE — 94799 UNLISTED PULMONARY SVC/PX: CPT

## 2023-01-05 PROCEDURE — 99233 SBSQ HOSP IP/OBS HIGH 50: CPT | Performed by: INTERNAL MEDICINE

## 2023-01-05 PROCEDURE — 25010000002 METHYLPREDNISOLONE PER 40 MG: Performed by: INTERNAL MEDICINE

## 2023-01-05 PROCEDURE — 85025 COMPLETE CBC W/AUTO DIFF WBC: CPT | Performed by: INTERNAL MEDICINE

## 2023-01-05 PROCEDURE — 25010000002 ENOXAPARIN PER 10 MG: Performed by: INTERNAL MEDICINE

## 2023-01-05 PROCEDURE — 94760 N-INVAS EAR/PLS OXIMETRY 1: CPT

## 2023-01-05 PROCEDURE — 94762 N-INVAS EAR/PLS OXIMTRY CONT: CPT

## 2023-01-05 PROCEDURE — 80048 BASIC METABOLIC PNL TOTAL CA: CPT | Performed by: INTERNAL MEDICINE

## 2023-01-05 PROCEDURE — 94664 DEMO&/EVAL PT USE INHALER: CPT

## 2023-01-05 RX ORDER — TRAZODONE HYDROCHLORIDE 100 MG/1
100 TABLET ORAL NIGHTLY
Status: DISCONTINUED | OUTPATIENT
Start: 2023-01-05 | End: 2023-01-06 | Stop reason: HOSPADM

## 2023-01-05 RX ORDER — PREDNISONE 20 MG/1
40 TABLET ORAL
Status: DISCONTINUED | OUTPATIENT
Start: 2023-01-06 | End: 2023-01-06 | Stop reason: HOSPADM

## 2023-01-05 RX ADMIN — AMOXICILLIN AND CLAVULANATE POTASSIUM 1 TABLET: 875; 125 TABLET, FILM COATED ORAL at 09:20

## 2023-01-05 RX ADMIN — METHYLPREDNISOLONE SODIUM SUCCINATE 40 MG: 40 INJECTION, POWDER, FOR SOLUTION INTRAMUSCULAR; INTRAVENOUS at 09:21

## 2023-01-05 RX ADMIN — TRAZODONE HYDROCHLORIDE 100 MG: 100 TABLET ORAL at 22:23

## 2023-01-05 RX ADMIN — LISINOPRIL 20 MG: 20 TABLET ORAL at 09:20

## 2023-01-05 RX ADMIN — LEVOTHYROXINE SODIUM 100 MCG: 0.1 TABLET ORAL at 05:02

## 2023-01-05 RX ADMIN — AMOXICILLIN AND CLAVULANATE POTASSIUM 1 TABLET: 875; 125 TABLET, FILM COATED ORAL at 22:23

## 2023-01-05 RX ADMIN — ENOXAPARIN SODIUM 40 MG: 100 INJECTION SUBCUTANEOUS at 17:49

## 2023-01-05 RX ADMIN — Medication 10 ML: at 22:23

## 2023-01-05 RX ADMIN — DOXYCYCLINE 100 MG: 100 CAPSULE ORAL at 09:20

## 2023-01-05 RX ADMIN — ARFORMOTEROL TARTRATE 15 MCG: 15 SOLUTION RESPIRATORY (INHALATION) at 19:07

## 2023-01-05 RX ADMIN — ARFORMOTEROL TARTRATE 15 MCG: 15 SOLUTION RESPIRATORY (INHALATION) at 06:37

## 2023-01-05 RX ADMIN — METHYLPREDNISOLONE SODIUM SUCCINATE 40 MG: 40 INJECTION, POWDER, FOR SOLUTION INTRAMUSCULAR; INTRAVENOUS at 17:49

## 2023-01-05 RX ADMIN — Medication 10 MG: at 22:23

## 2023-01-05 RX ADMIN — BUDESONIDE 0.5 MG: 0.5 INHALANT ORAL at 19:07

## 2023-01-05 RX ADMIN — METHYLPREDNISOLONE SODIUM SUCCINATE 40 MG: 40 INJECTION, POWDER, FOR SOLUTION INTRAMUSCULAR; INTRAVENOUS at 01:27

## 2023-01-05 RX ADMIN — BUDESONIDE 0.5 MG: 0.5 INHALANT ORAL at 06:37

## 2023-01-05 RX ADMIN — GUAIFENESIN 1200 MG: 600 TABLET ORAL at 22:23

## 2023-01-05 RX ADMIN — HYDROXYZINE HYDROCHLORIDE 25 MG: 25 TABLET, FILM COATED ORAL at 14:04

## 2023-01-05 RX ADMIN — GUAIFENESIN 1200 MG: 600 TABLET ORAL at 09:21

## 2023-01-05 RX ADMIN — PANTOPRAZOLE SODIUM 40 MG: 40 TABLET, DELAYED RELEASE ORAL at 05:02

## 2023-01-05 NOTE — PROGRESS NOTES
Pulmonary / Critical Care Progress Note      Patient Name: Delvis Boston  : 1965  MRN: [id number]  Primary Care Physician:  Margie Garcia APRN  Date of admission: 2022    Subjective   Subjective   Follow-up for COPD exacerbation, left lower lobe lung nodule, respiratory failure.     Over past 24 hours: remained on nebs- brovana, pulmicort, IV solu-medrol 40 IV q8 hrs, protonix 40 daily, doxy bid. RT  on board.  Continued on anxiolytics.  Had bronchoscopy with mucous plug removal.    No acute events overnight.     This morning,   Breathing is better.  Cough and wheezing better  Not able to get any secretions.  No significant chest tightness.  No fever or chills.  No nausea or vomiting.      Review of Systems  General:  Fatigue, No Fever  HEENT: No dysphagia, No Visual Changes, no rhinorrhea  Respiratory:  + cough,+Dyspnea, no phlegm, No Pleuritic Pain, + wheezing, no hemoptysis  Cardiovascular: Denies chest pain, denies palpitations,+RIVERA, No Chest Pressure  Gastrointestinal:  No Abdominal Pain, No Nausea, No Vomiting, No Diarrhea  Genitourinary:  No Dysuria, No Frequency, No Hesitancy  Musculoskeletal: No muscle pain or swelling  Endocrine:  No Heat Intolerance, No Cold Intolerance  Neurologic:  No Confusion, no Dysarthria, No Headaches  Skin:  No Rash, No Open Wounds          Objective   Objective     Vitals:   Temp:  [97.2 °F (36.2 °C)-98.3 °F (36.8 °C)] 97.3 °F (36.3 °C)  Heart Rate:  [78-97] 83  Resp:  [18-39] 20  BP: (119-192)/() 149/80  Flow (L/min):  [2-6] 2     Physical Exam   Vital Signs Reviewed   General:  WDWN, Alert, has mild conversational dyspnea.    HEENT:  PERRL, EOMI.  OP, nares clear, no sinus tenderness  Neck:  Supple, no JVD, no thyromegaly  Chest:  good aeration, expiratory wheezing bilaterally, no crackles or rhonchi, tympanic to percussion bilaterally, no work of breathing noted  CV: RRR, no MGR, pulses 2+, equal.  Abd:  Soft, NT, ND, + BS, no HSM  EXT:   no clubbing, no cyanosis, no edema  Neuro:  A&Ox3, CN grossly intact, no focal deficits.  Skin: No rashes or lesions noted      Result Review    Result Review:  I have personally reviewed the results from the time of this admission to 1/5/2023 07:29 EST and agree with these findings:  [x]  Laboratory  [x]  Microbiology  [x]  Radiology  [x]  EKG/Telemetry   [x]  Cardiology/Vascular   []  Pathology  []  Old records  []  Other:  Most notable findings include:         Lab 01/04/23  0548 01/03/23  0540 01/01/23  0638 12/31/22  1235   WBC 16.92* 19.40* 8.13 9.27   HEMOGLOBIN 15.4 15.3 16.1 15.6   HEMATOCRIT 49.9 48.4 50.3 48.2   PLATELETS 216 241 191 190   SODIUM 139 140 136 136   POTASSIUM 4.8 4.4 5.2 4.3   CHLORIDE 96* 99 98 98   CO2 40.4* 37.4* 27.0 30.8*   BUN 13 12 11 9   CREATININE 0.69* 0.71* 0.83 0.85   GLUCOSE 159* 168* 184* 101*   CALCIUM 9.5 9.3 9.6 9.3   PHOSPHORUS  --  3.3 3.8  --    TOTAL PROTEIN  --  6.5  --  7.1   ALBUMIN  --  3.5  --  4.0   GLOBULIN  --   --   --  3.1     Resp culture negative.     CT Chest Without Contrast Diagnostic    Result Date: 1/1/2023  PROCEDURE: CT CHEST WO CONTRAST DIAGNOSTIC  COMPARISON: [facility], CR, XR CHEST 1 VW, 12/31/2022, 12:45.  [facility], CT, CHEST W/O CONTRAST, 10/08/2014, 12:36.  [facility], CT, ABDOMEN/PELVIS WITH CONTRAST, 9/18/2015, 13:31.  INDICATIONS: Respiratory illness, nondiagnostic xray  TECHNIQUE: CT images were created without the administration of contrast material.   PROTOCOL:   Standard imaging protocol performed    RADIATION:   DLP: 359.7 mGy*cm   Automated exposure control was utilized to minimize radiation dose.  FINDINGS:  Lung window images reveal marked emphysema.  Pleural thickening in the right hemithorax with calcification appear stable.  Scarring in the right lower lobe is unchanged.  1.5 cm ill-defined noncalcified nodule is seen in the left lower lobe, well seen on series 202, image 99. This  is suspicious for bronchogenic malignancy or lung cancer, a small bronchus is seen centrally.  Mediastinal windows reveal no mediastinal or axillary adenopathy.  Extensive coronary artery calcifications are evident.  Mild degenerative spurring is seen in the thoracic spine.       Impression:   CT scan of the chest with IV contrast demonstrating marked emphysema.  1.5 cm mass in the left lower lobe is suspicious for bronchogenic malignancy or lung cancer.     DANETTE KINCAID MD       Electronically Signed and Approved By: DANETTE KINCAID MD on 1/01/2023 at 9:42                 Assessment & Plan   Assessment / Plan     Active Hospital Problems:  Active Hospital Problems    Diagnosis    • **Acute exacerbation of chronic obstructive pulmonary disease (COPD) (HCC)    • COPD exacerbation (HCC)    • Hypoxia          Impression:  COPD with acute exacerbation  Left lower lobe pulmonary nodule  History of smoking in past    Plan:   CT chest with severe emphysema, left lower lobe pulmonary nodule  Continue Brovana and Pulmicort, DuoNeb.  Continue with IV Solu-Medrol 40 mg every 8 hours  Continue hydroxyzine and alprazolam for anxiety.  Clinically not improved with aggressive treatment medically.  Status post bronchoscopy.  Currently  growing haemophilus influenza, parainfluenza  and human metapneumovirus.  Continue with Augmentin.  RT  is consulted.  Will need PFT, alpha-1 antitrypsin level and genetics as an outpatient  He will need NIPPV for home likely.  Likely will need oxygen with sleep  Wean oxygen as tolerated.  Check overnight oximetry.    DVT prophylaxis:  Medical DVT prophylaxis orders are present.    CODE STATUS:   Code Status (Patient has no pulse and is not breathing): CPR (Attempt to Resuscitate)  Medical Interventions (Patient has pulse or is breathing): Full Support    I personally reviewed pertinent labs, imaging and provider notes. Discussed with bedside nurse and will discuss with primary service.        Electronically signed by Ja Joyner MD, 1/5/2023, 07:29 EST.

## 2023-01-05 NOTE — PLAN OF CARE
Problem: Adult Inpatient Plan of Care  Goal: Plan of Care Review  Outcome: Ongoing, Progressing  Flowsheets (Taken 1/5/2023 1551)  Progress: no change  Plan of Care Reviewed With: patient  Outcome Evaluation: Patient on 2L humidified NC. Patient given PO atarax for anxiety. Patient has no other complaints at this time.  Goal: Patient-Specific Goal (Individualized)  Outcome: Ongoing, Progressing  Goal: Absence of Hospital-Acquired Illness or Injury  Outcome: Ongoing, Progressing  Intervention: Identify and Manage Fall Risk  Recent Flowsheet Documentation  Taken 1/5/2023 1405 by Pearl Luna RN  Safety Promotion/Fall Prevention: safety round/check completed  Taken 1/5/2023 1300 by Pearl Luna RN  Safety Promotion/Fall Prevention: safety round/check completed  Taken 1/5/2023 1120 by Pearl Luna RN  Safety Promotion/Fall Prevention: safety round/check completed  Taken 1/5/2023 0920 by Pearl Luna RN  Safety Promotion/Fall Prevention: safety round/check completed  Taken 1/5/2023 0830 by Pearl Luna RN  Safety Promotion/Fall Prevention:   assistive device/personal items within reach   clutter free environment maintained   lighting adjusted   nonskid shoes/slippers when out of bed   room organization consistent   safety round/check completed  Intervention: Prevent Infection  Recent Flowsheet Documentation  Taken 1/5/2023 0830 by Pearl Luna RN  Infection Prevention:   cohorting utilized   environmental surveillance performed   hand hygiene promoted   single patient room provided  Goal: Optimal Comfort and Wellbeing  Outcome: Ongoing, Progressing  Intervention: Provide Person-Centered Care  Recent Flowsheet Documentation  Taken 1/5/2023 0920 by Pearl Luna RN  Trust Relationship/Rapport:   care explained   choices provided   emotional support provided   empathic listening provided   questions encouraged   questions answered   reassurance provided   thoughts/feelings acknowledged  Goal:  Readiness for Transition of Care  Outcome: Ongoing, Progressing     Problem: Behavioral Health Comorbidity  Goal: Maintenance of Behavioral Health Symptom Control  Outcome: Ongoing, Progressing  Intervention: Maintain Behavioral Health Symptom Control  Recent Flowsheet Documentation  Taken 1/5/2023 0830 by Pearl Luna RN  Medication Review/Management:   medications reviewed   high-risk medications identified     Problem: COPD (Chronic Obstructive Pulmonary Disease) Comorbidity  Goal: Maintenance of COPD Symptom Control  Outcome: Ongoing, Progressing  Intervention: Maintain COPD-Symptom Control  Recent Flowsheet Documentation  Taken 1/5/2023 0830 by Pearl Luna RN  Medication Review/Management:   medications reviewed   high-risk medications identified     Problem: Hypertension Comorbidity  Goal: Blood Pressure in Desired Range  Outcome: Ongoing, Progressing  Intervention: Maintain Blood Pressure Management  Recent Flowsheet Documentation  Taken 1/5/2023 0830 by Pearl Luna RN  Medication Review/Management:   medications reviewed   high-risk medications identified   Goal Outcome Evaluation:  Plan of Care Reviewed With: patient        Progress: no change  Outcome Evaluation: Patient on 2L humidified NC. Patient given PO atarax for anxiety. Patient has no other complaints at this time.

## 2023-01-05 NOTE — PROGRESS NOTES
[facility]   Hospitalist Progress Note    Date of admission: 12/31/2022  Patient Name: Delvis Boston  1965  Date: 1/5/2023      Subjective     Chief Complaint   Patient presents with   • Shortness of Breath       Summary: 57 y.o. Presented with worsening shortness of air found to have COPD exacerbation and left lower lobe nodule concerning for malignancy.  Pulmonology assisting patient undergoing bronchoscopy for further evaluation given concern for malignancy on 1/4/2023.  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  Patient underwent bronchoscopy on 1/4 with notable mucus plugging and secretions, bronchial washings obtained.    Interval Followup: Still very anxious at times and his pain levels of  His breathing is slowly improving but then will have periods where he has a lot of trouble getting his breath.  Still having cough largely dry hacking nonproductive.  States would have been willing to try NIPPV but was not placed on them.  Varying stories based on discussion with nursing.  They did check overnight pulse ox study.    Asking for something help with sleep tonight.  Discussed stopping for anxiety as well and willing to try trazodone.    Objective     Vitals:   Temp:  [97.3 °F (36.3 °C)-98.2 °F (36.8 °C)] 98.2 °F (36.8 °C)  Heart Rate:  [77-97] 88  Resp:  [20-24] 22  BP: (143-160)/(79-90) 158/80  Flow (L/min):  [2] 2    Physical Exam  Sitting up with his arms resting on the table, rhonchi, diminished in bilateral lung fields with end expiratory wheezing, conversational dyspnea, slowly improving from yesterday    Result Review:  Vital signs, labs and recent relevant imaging reviewed.      amoxicillin-clavulanate, 1 tablet, Oral, Q12H  arformoterol, 15 mcg, Nebulization, BID - RT  budesonide, 0.5 mg, Nebulization, BID - RT  enoxaparin, 40 mg, Subcutaneous,  Q24H  guaiFENesin, 1,200 mg, Oral, Q12H  levothyroxine, 100 mcg, Oral, Q AM  lisinopril, 20 mg, Oral, Daily  melatonin, 10 mg, Oral, Nightly  methylPREDNISolone sodium succinate, 40 mg, Intravenous, Q8H  pantoprazole, 40 mg, Oral, Q AM  polyethylene glycol, 17 g, Oral, BID  traZODone, 100 mg, Oral, Nightly        •  albuterol  •  ALPRAZolam  •  benzonatate  •  HYDROcod Polst-CPM Polst ER  •  hydrOXYzine  •  ipratropium-albuterol  •  ondansetron  •  sodium chloride      Assessment / Plan     Assessment/Plan:  COPD with acute exacerbation, on room air baseline  Acute hypoxemic respiratory failure  Haemophilus influenza pneumonia  Metapneumovirus pneumonia   parainfluenza pneumonia  Bilateral mucous plugging   Mild diastolic dysfunction with normal EF   1.5 cm left lower lobe nodule concerning for malignancy  History of heavy tobacco smoking quit about excellently 15 years ago  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  Bicarb increasing, suspect have component of compensation for retention as well.  - Start trazodone for sleep and anxiety  - Continue antibiotics with Augmentin, doxycycline 5-day course completing today  -Continue nebulizers and respiratory hygiene  -RT  assisting, discussed with Anne-Marie Luna and Dr. Joyner  - Nebulizer respiratory hygiene  -Continue blood pressure medications, reasonable overall  - Continue antitussives as needed  - Guaifenesin respiratory clearance measures   -Scheduled bowel regimen  - Outpatient PFTs and alpha-1 antitrypsin level required outpatient  - Continue Synthroid at 100, TSH was  elevated at 17, follow-up outpatient may need further adjustment then.   - f/u  a.m. labs    Continue inpatient monitoring and treatment as above.  Will need walk test prior to discharge.  Was on room air at baseline      DVT prophylaxis:  Medical DVT prophylaxis orders are present.    Code Status (Patient has no pulse and is not breathing): CPR (Attempt to Resuscitate)  Medical Interventions (Patient has pulse or is breathing): Full Support        CBC    CBC 1/3/23 1/4/23 1/5/23   WBC 19.40 (A) 16.92 (A) 13.48 (A)   RBC 5.31 5.44 5.70   Hemoglobin 15.3 15.4 16.3   Hematocrit 48.4 49.9 51.6 (A)   MCV 91.1 91.7 90.5   MCH 28.8 28.3 28.6   MCHC 31.6 30.9 (A) 31.6   RDW 13.4 13.2 12.8   Platelets 241 216 165   (A) Abnormal value              CMP    CMP 1/3/23 1/3/23 1/4/23 1/5/23    0540 0540     Glucose  168 (A) 159 (A) 190 (A)   BUN  12 13 14   Creatinine  0.71 (A) 0.69 (A) 0.90   eGFR  105.7 106.6 98.4   Sodium  140 139 138   Potassium  4.4 4.8 4.9   Chloride  99 96 (A) 91 (A)   Calcium  9.3 9.5 9.8   Total Protein 6.5      Albumin 3.5      Total Bilirubin <0.2      Alkaline Phosphatase 75      AST (SGOT) 13      ALT (SGPT) 17      BUN/Creatinine Ratio  16.9 18.8 15.6   Anion Gap  3.6 (A) 2.6 (A) 5.7   (A) Abnormal value       Comments are available for some flowsheets but are not being displayed.             Dispo: pending stability in clinical condition and appropriate discharge location.

## 2023-01-05 NOTE — NURSING NOTE
Exercise Oximetry    Patient Name:Delvis Boston   MRN: [id number]   Date: 01/05/23             ROOM AIR BASELINE   SpO2% 88   Heart Rate 92   Blood Pressure      EXERCISE ON ROOM AIR SpO2% EXERCISE ON O2 @ 5 LPM SpO2%   1 MINUTE  1 MINUTE  87   2 MINUTES  2 MINUTES 90   3 MINUTES  3 MINUTES 91   4 MINUTES  4 MINUTES 93   5 MINUTES  5 MINUTES 94   6 MINUTES  6 MINUTES 95              Distance Walked   Distance Walked 250ft   Dyspnea (Gilma Scale)   Dyspnea (Gilma Scale)   Fatigue (Gilma Scale)  Fatigue (Gilma Scale)   SpO2% Post Exercise   SpO2% Post Exercise 96   HR Post Exercise   HR Post Exercise 100   Time to Recovery   Time to Recovery 1 minute     Comments: Patient started on 1L NC

## 2023-01-06 VITALS
BODY MASS INDEX: 29.9 KG/M2 | OXYGEN SATURATION: 90 % | HEIGHT: 67 IN | DIASTOLIC BLOOD PRESSURE: 84 MMHG | HEART RATE: 93 BPM | RESPIRATION RATE: 20 BRPM | WEIGHT: 190.48 LBS | TEMPERATURE: 97.6 F | SYSTOLIC BLOOD PRESSURE: 132 MMHG

## 2023-01-06 LAB
BACTERIA SPEC AEROBE CULT: NORMAL
BACTERIA SPEC RESP CULT: NORMAL
CYTO UR: NORMAL
GRAM STN SPEC: NORMAL
GRAM STN SPEC: NORMAL
LAB AP CASE REPORT: NORMAL
LAB AP CLINICAL INFORMATION: NORMAL
LAB AP SPECIAL STAINS: NORMAL
PATH REPORT.FINAL DX SPEC: NORMAL
PATH REPORT.GROSS SPEC: NORMAL
STAT OF ADQ CVX/VAG CYTO-IMP: NORMAL

## 2023-01-06 PROCEDURE — 63710000001 PREDNISONE PER 1 MG: Performed by: INTERNAL MEDICINE

## 2023-01-06 PROCEDURE — 94799 UNLISTED PULMONARY SVC/PX: CPT

## 2023-01-06 PROCEDURE — 99233 SBSQ HOSP IP/OBS HIGH 50: CPT | Performed by: INTERNAL MEDICINE

## 2023-01-06 PROCEDURE — 99239 HOSP IP/OBS DSCHRG MGMT >30: CPT | Performed by: INTERNAL MEDICINE

## 2023-01-06 RX ORDER — PREDNISONE 20 MG/1
40 TABLET ORAL
Status: DISCONTINUED | OUTPATIENT
Start: 2023-01-07 | End: 2023-01-06 | Stop reason: HOSPADM

## 2023-01-06 RX ORDER — ECHINACEA PURPUREA EXTRACT 125 MG
1 TABLET ORAL AS NEEDED
Qty: 30 ML | Refills: 0 | Status: SHIPPED | OUTPATIENT
Start: 2023-01-06

## 2023-01-06 RX ORDER — PREDNISONE 20 MG/1
40 TABLET ORAL
Qty: 4 TABLET | Refills: 0 | Status: SHIPPED | OUTPATIENT
Start: 2023-01-07 | End: 2023-01-09

## 2023-01-06 RX ORDER — IPRATROPIUM BROMIDE AND ALBUTEROL SULFATE 2.5; .5 MG/3ML; MG/3ML
3 SOLUTION RESPIRATORY (INHALATION)
Qty: 360 ML | Refills: 0 | Status: SHIPPED | OUTPATIENT
Start: 2023-01-06 | End: 2023-01-06 | Stop reason: SDUPTHER

## 2023-01-06 RX ORDER — BUDESONIDE 0.5 MG/2ML
0.5 INHALANT ORAL
Qty: 120 ML | Refills: 0 | Status: SHIPPED | OUTPATIENT
Start: 2023-01-06 | End: 2023-02-05

## 2023-01-06 RX ORDER — ARFORMOTEROL TARTRATE 15 UG/2ML
15 SOLUTION RESPIRATORY (INHALATION)
Qty: 120 ML | Refills: 0 | Status: SHIPPED | OUTPATIENT
Start: 2023-01-06 | End: 2023-02-05

## 2023-01-06 RX ORDER — FLUTICASONE PROPIONATE 50 MCG
2 SPRAY, SUSPENSION (ML) NASAL DAILY
Qty: 11.1 ML | Refills: 0 | Status: SHIPPED | OUTPATIENT
Start: 2023-01-06

## 2023-01-06 RX ORDER — AMOXICILLIN AND CLAVULANATE POTASSIUM 875; 125 MG/1; MG/1
1 TABLET, FILM COATED ORAL EVERY 12 HOURS SCHEDULED
Qty: 5 TABLET | Refills: 0 | Status: SHIPPED | OUTPATIENT
Start: 2023-01-06 | End: 2023-01-09

## 2023-01-06 RX ORDER — IPRATROPIUM BROMIDE AND ALBUTEROL SULFATE 2.5; .5 MG/3ML; MG/3ML
3 SOLUTION RESPIRATORY (INHALATION)
Qty: 180 ML | Refills: 0 | Status: SHIPPED | OUTPATIENT
Start: 2023-01-06 | End: 2023-01-21

## 2023-01-06 RX ADMIN — PREDNISONE 40 MG: 20 TABLET ORAL at 08:07

## 2023-01-06 RX ADMIN — AMOXICILLIN AND CLAVULANATE POTASSIUM 1 TABLET: 875; 125 TABLET, FILM COATED ORAL at 08:07

## 2023-01-06 RX ADMIN — LISINOPRIL 20 MG: 20 TABLET ORAL at 08:07

## 2023-01-06 RX ADMIN — ARFORMOTEROL TARTRATE 15 MCG: 15 SOLUTION RESPIRATORY (INHALATION) at 06:49

## 2023-01-06 RX ADMIN — PANTOPRAZOLE SODIUM 40 MG: 40 TABLET, DELAYED RELEASE ORAL at 06:01

## 2023-01-06 RX ADMIN — HYDROXYZINE HYDROCHLORIDE 25 MG: 25 TABLET, FILM COATED ORAL at 00:35

## 2023-01-06 RX ADMIN — GUAIFENESIN 1200 MG: 600 TABLET ORAL at 08:07

## 2023-01-06 RX ADMIN — HYDROXYZINE HYDROCHLORIDE 25 MG: 25 TABLET, FILM COATED ORAL at 10:05

## 2023-01-06 RX ADMIN — Medication 10 ML: at 08:08

## 2023-01-06 RX ADMIN — BUDESONIDE 0.5 MG: 0.5 INHALANT ORAL at 06:49

## 2023-01-06 RX ADMIN — LEVOTHYROXINE SODIUM 100 MCG: 0.1 TABLET ORAL at 06:01

## 2023-01-06 NOTE — DISCHARGE SUMMARY
[facility]         HOSPITALIST  DISCHARGE SUMMARY    Patient Name: Delvis Boston    : 1965    MRN: [id number]    Date of Admission: 2022  Date of Discharge:  23  Primary Care Physician: Margie Garcia APRN    Consults     Date and Time Order Name Status Description    2023  8:13 AM Inpatient Pulmonology Consult Completed     2022  4:14 PM Inpatient Hospitalist Consult            Final Diagnosis:  COPD with acute exacerbation, on room air baseline  Acute hypoxemic respiratory failure  Haemophilus influenza pneumonia  Metapneumovirus pneumonia   parainfluenza pneumonia  Bilateral mucous plugging   Mild diastolic dysfunction with normal EF   1.5 cm left lower lobe nodule concerning for malignancy  Likely obstructive sleep apnea, positive overnight pulse ox pending outpatient formal study  History of heavy tobacco smoking quit about excellently 15 years ago  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 Presented with worsening shortness of air found to have COPD exacerbation and left lower lobe nodule concerning for malignancy.  Pulmonology assisting patient undergoing bronchoscopy for further evaluation given concern for malignancy on 2023.  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  Patient underwent bronchoscopy on  with notable mucus plugging and secretions, bronchial washings obtained.    Overnight pulse oximetry notable for frequent desaturations.  Outpatient formal sleep study will be required.  2 L nasal cannula oxygen requirement for discharge with home oxygen established.  Nebulizers and adjusted medications prior for discharge.  Remaining course of antibiotics prescribed.  Have close follow-up with  pulmonology for continued monitoring and follow-up of bronchoscopy test.    Patient discharged in stable condition with close PCP and pulm follow up.   Return precautions and follow up discussed and patient and wife voiced agreement and understanding of treatment plan.     DISCHARGE Follow Up Recommendations for labs and diagnostics:   Follow-up anxiety/patient considering resuming escitalopram in the future  Follow-up with pulmonology for monitoring of COPD and sleep apnea  Follow-up outpatient sleep study    CODE STATUS:  Code Status and Medical Interventions:   Ordered at: 12/31/22 3937     Code Status (Patient has no pulse and is not breathing):    CPR (Attempt to Resuscitate)     Medical Interventions (Patient has pulse or is breathing):    Full Support           Day of Discharge     Vital Signs:  Temp:  [97.4 °F (36.3 °C)-98.2 °F (36.8 °C)] 97.4 °F (36.3 °C)  Heart Rate:  [] 91  Resp:  [18-22] 20  BP: (147-158)/(80-87) 155/87  Flow (L/min):  [2] 2    Physical Exam  Gen: awake, resting in bed, conversant  HENT: NCAT, mmm  Resp: Moderate aeration, mild conversational dyspnea on 2 L nasal cannula, no acute wheezing improving from prior   CV: RRR, no LE pitting edema  GI: Abdomen soft, NT, ND, no guarding, +BS  Psych: Fair but somewhat anxious mood and affect, aox3  Skin: warm, dry      Discharge Details        Discharge Medications      New Medications      Instructions Start Date   amoxicillin-clavulanate 875-125 MG per tablet  Commonly known as: AUGMENTIN   1 tablet, Oral, Every 12 Hours Scheduled      arformoterol 15 MCG/2ML nebulizer solution  Commonly known as: BROVANA   15 mcg, Nebulization, 2 Times Daily - RT      budesonide 0.5 MG/2ML nebulizer solution  Commonly known as: PULMICORT   0.5 mg, Nebulization, 2 Times Daily - RT      fluticasone 50 MCG/ACT nasal spray  Commonly known as: FLONASE   2 sprays, Nasal, Daily      predniSONE 20 MG tablet  Commonly known as: DELTASONE   40 mg, Oral, Daily With  Breakfast   Start Date: January 7, 2023     sodium chloride 0.65 % nasal spray  Commonly known as: Ocean Nasal Spray   1 spray, Nasal, As Needed         Changes to Medications      Instructions Start Date   ipratropium-albuterol  MCG/ACT inhaler  Commonly known as: COMBIVENT RESPIMAT  What changed: Another medication with the same name was added. Make sure you understand how and when to take each.   1 puff, Inhalation, 4 Times Daily PRN      ipratropium-albuterol 0.5-2.5 mg/3 ml nebulizer  Commonly known as: DUO-NEB  What changed: You were already taking a medication with the same name, and this prescription was added. Make sure you understand how and when to take each.   3 mL, Nebulization, 4 Times Daily - RT, Take four times a day for now and can decrease to as needed once your symptoms start to improve         Continue These Medications      Instructions Start Date   levothyroxine 100 MCG tablet  Commonly known as: SYNTHROID, LEVOTHROID   100 mcg, Oral, Daily      lisinopril 20 MG tablet  Commonly known as: PRINIVIL,ZESTRIL   20 mg, Oral, Daily         Stop These Medications    Fluticasone Furoate-Vilanterol 100-25 MCG/ACT aerosol powder               Discharge Disposition:  Home or Self Care    Diet: patient counseled on dietary changes made during hospital and plans to  advance as tolerated     Discharge Activity: advance as tolerated    No future appointments.    Additional Instructions for the Follow-ups that You Need to Schedule     Discharge Follow-up with PCP   As directed       Currently Documented PCP:    Margie Garcia APRN    PCP Phone Number:    [phone]     Follow Up Details: 3-5 DAYS HOSPITAL F/U         Discharge Follow-up with Specified Provider: 1-2 WEEKS PULMONOLOGY F/U   As directed      To: 1-2 WEEKS PULMONOLOGY F/U               Pertinent  and/or Most Recent Results       LAB RESULTS:      Lab 01/05/23  1200 01/04/23  0548 01/03/23  0540 01/01/23  0638 12/31/22  1235   WBC 13.48*  16.92* 19.40* 8.13 9.27   HEMOGLOBIN 16.3 15.4 15.3 16.1 15.6   HEMATOCRIT 51.6* 49.9 48.4 50.3 48.2   PLATELETS 165 216 241 191 190   NEUTROS ABS 10.96* 13.88* 16.72* 7.19* 7.11*   IMMATURE GRANS (ABS) 0.35* 0.29* 0.21* 0.05 0.02   LYMPHS ABS 1.08 1.57 1.44 0.70 0.98   MONOS ABS 0.98* 1.07* 1.00* 0.18 0.85   EOS ABS 0.01 0.00 0.00 0.00 0.19   MCV 90.5 91.7 91.1 89.5 87.2   PROCALCITONIN  --  0.05  --   --   --          Lab 01/05/23  1200 01/04/23  0548 01/03/23  0540 01/01/23  0638 12/31/22  1235   SODIUM 138 139 140 136 136   POTASSIUM 4.9 4.8 4.4 5.2 4.3   CHLORIDE 91* 96* 99 98 98   CO2 41.3* 40.4* 37.4* 27.0 30.8*   ANION GAP 5.7 2.6* 3.6* 11.0 7.2   BUN 14 13 12 11 9   CREATININE 0.90 0.69* 0.71* 0.83 0.85   EGFR 98.4 106.6 105.7 100.8 100.1   GLUCOSE 190* 159* 168* 184* 101*   CALCIUM 9.8 9.5 9.3 9.6 9.3   MAGNESIUM  --   --  2.2 2.3  --    PHOSPHORUS  --   --  3.3 3.8  --    TSH  --   --   --   --  17.780*         Lab 01/03/23  0540 12/31/22  1235   TOTAL PROTEIN 6.5 7.1   ALBUMIN 3.5 4.0   GLOBULIN  --  3.1   ALT (SGPT) 17 15   AST (SGOT) 13 17   BILIRUBIN <0.2 0.5   BILIRUBIN DIRECT <0.2  --    ALK PHOS 75 74         Lab 12/31/22  1235   PROBNP 110.9   TROPONIN T <0.010                 Lab 12/31/22  1438   PH, ARTERIAL 7.364   PCO2, ARTERIAL 54.1*   PO2 ART 65.3*   O2 SATURATION ART 92.6*   HCO3 ART 30.2*   BASE EXCESS ART 3.3*   CARBOXYHEMOGLOBIN 1.2     Brief Urine Lab Results     None        Microbiology Results (last 10 days)     Procedure Component Value - Date/Time    AFB Culture - Wash, Bronchus [[id number]] Collected: 01/04/23 1257    Lab Status: Preliminary result Specimen: Wash from Bronchus Updated: 01/05/23 1200     AFB Stain No acid fast bacilli seen on direct smear      No acid fast bacilli seen on concentrated smear    Respiratory Culture - Wash, Bronchus [[id number]] Collected: 01/04/23 1257    Lab Status: Final result Specimen: Wash from Bronchus Updated: 01/06/23 1033     Respiratory  Culture Moderate growth (3+) Normal respiratory zak. No S. aureus or Pseudomonas aeruginosa detected. Final report.     Gram Stain Occasional Gram positive cocci in clusters    AFB Culture - Lavage, Lung, Left Lower Lobe [[id number]] Collected: 01/04/23 1254    Lab Status: Preliminary result Specimen: Lavage from Lung, Left Lower Lobe Updated: 01/05/23 1200     AFB Stain No acid fast bacilli seen on direct smear      No acid fast bacilli seen on concentrated smear    BAL Culture, Quantitative - Lavage, Lung, Left Lower Lobe [[id number]] Collected: 01/04/23 1254    Lab Status: Final result Specimen: Lavage from Lung, Left Lower Lobe Updated: 01/06/23 1033     BAL Culture 25,000 CFU/mL Normal respiratory zak. No S. aureus or Pseudomonas aeruginosa detected. Final report.     Gram Stain No organisms seen    Pneumonia Panel - Lavage, Lung, Left Lower Lobe [[id number]]  (Abnormal) Collected: 01/04/23 1254    Lab Status: Final result Specimen: Lavage from Lung, Left Lower Lobe Updated: 01/04/23 1523     Escherichia coli PCR Not Detected     Acinetobacter calcoaceticus-baumannii complex PCR Not Detected     Enterobacter cloacae PCR Not Detected     Klebsiella oxytoca PCR Not Detected     Klebsiella pneumoniae group PCR Not Detected     Klebsiella aerogenes PCR Not Detected     Moraxella catarrhalis PCR Not Detected     Proteus species PCR Not Detected     Pseudomonas aeroginosa PCR Not Detected     Serratia marcescens PCR Not Detected     Staphylococcus aureus PCR Not Detected     Streptococcus pyogenes PCR Not Detected     Haemophilus influenzae PCR Detected     Comment: 10^6 Bin copies/mL        Streptococcus agalactiae PCR Not Detected     Streptococcus pneumoniae PCR Not Detected     Chlamydophila pneumoniae PCR Not Detected     Legionella pneumophilia PCR Not Detected     Mycoplasma pneumo by PCR Not Detected     ADENOVIRUS, PCR Not Detected     CTX-M Gene N/A     IMP Gene N/A     KPC Gene N/A     mecA/C and MREJ  Gene N/A     NDM Gene N/A     OXA-48-like Gene N/A     VIM Gene N/A     Coronavirus Not Detected     Human Metapneumovirus Detected     Human Rhinovirus/Enterovirus Not Detected     Influenza A PCR Not Detected     Influenza B PCR Not Detected     RSV, PCR Not Detected     Parainfluenza virus PCR Detected    Respiratory Culture - Sputum, Cough [[id number]]  (Abnormal) Collected: 01/01/23 0950    Lab Status: Final result Specimen: Sputum from Cough Updated: 01/03/23 1311     Respiratory Culture Light growth (2+) Normal Respiratory Tara      Heavy growth (4+) Haemophilus influenzae     Comment: This isolate is beta-lactamase NEGATIVE and are routinely susceptible to ampicillin and other beta-lactams.           Gram Stain Rare (1+) Epithelial cells seen      Moderate (3+) WBCs seen      Few (2+) Gram positive cocci in pairs, chains and clusters      Rare (1+) Gram negative bacilli, tiny    S. Pneumo Ag Urine or CSF - Urine, Urine, Clean Catch [[id number]]  (Normal) Collected: 01/01/23 0946    Lab Status: Final result Specimen: Urine, Clean Catch Updated: 01/01/23 1019     Strep Pneumo Ag Negative    Legionella Antigen, Urine - Urine, Urine, Clean Catch [[id number]]  (Normal) Collected: 01/01/23 0946    Lab Status: Final result Specimen: Urine, Clean Catch Updated: 01/01/23 1018     LEGIONELLA ANTIGEN, URINE Negative    Mycoplasma Pneumoniae Antibody, IgM - Blood, Arm, Left [[id number]]  (Normal) Collected: 01/01/23 0837    Lab Status: Final result Specimen: Blood from Arm, Left Updated: 01/01/23 0951     Mycoplasma pneumo IgM Negative    COVID PRE-OP / PRE-PROCEDURE SCREENING ORDER (NO ISOLATION) - Swab, Nasopharynx [[id number]]  (Normal) Collected: 12/31/22 1733    Lab Status: Final result Specimen: Swab from Nasopharynx Updated: 12/31/22 2235    Narrative:      The following orders were created for panel order COVID PRE-OP / PRE-PROCEDURE SCREENING ORDER (NO ISOLATION) - Swab, Nasopharynx.  Procedure                                Abnormality         Status                     ---------                               -----------         ------                     COVID-19,APTIMA PANTHER(...[[id number]]  Normal              Final result                 Please view results for these tests on the individual orders.    COVID-19,APTIMA PANTHER([place]),[facility], NP/OP SWAB IN UTM/VTM/SALINE TRANSPORT MEDIA,24 HR TAT - Swab, Nasopharynx [[id number]]  (Normal) Collected: 12/31/22 1733    Lab Status: Final result Specimen: Swab from Nasopharynx Updated: 12/31/22 2234     COVID19 Not Detected    Narrative:      Fact sheet for providers: https://www.fda.gov/media/386431/download     Fact sheet for patients: https://www.fda.gov/media/961017/download    Test performed by RT PCR.    Influenza Antigen, Rapid - Swab, Nasopharynx [[id number]]  (Normal) Collected: 12/31/22 1733    Lab Status: Final result Specimen: Swab from Nasopharynx Updated: 12/31/22 1809     Influenza A Ag, EIA Negative     Influenza B Ag, EIA Negative          RADIOLOGY:    CT Chest Without Contrast Diagnostic    Result Date: 1/1/2023  Impression:   CT scan of the chest with IV contrast demonstrating marked emphysema.  1.5 cm mass in the left lower lobe is suspicious for bronchogenic malignancy or lung cancer.     DANETTE KINCAID MD       Electronically Signed and Approved By: DANETTE KINCAID MD on 1/01/2023 at 9:42             XR Chest 1 View    Result Date: 1/4/2023  Impression:   1. Emphysematous changes 2. No acute infiltrate 3. Indeterminate nodular density previously noted in the left lower lobe on chest CT not well demonstrated.  Consider follow-up PET CT to further evaluate. 4. Calcified pleural plaques on the right may be secondary to previous pleural infection or hemorrhage versus asbestos exposure.       Alok Humphrey M.D.       Electronically Signed and Approved By: Alok Humphrey M.D. on 1/04/2023 at 18:46             XR Chest 1 View    Result Date:  12/31/2022  Impression:   1. A severe emphysematous changes 2. Probable scarring/fibrosis in the mid and lower lung fields bilaterally. 3. No acute consolidation identified.       Alok Humphrey M.D.       Electronically Signed and Approved By: Alok Humphrey M.D. on 12/31/2022 at 13:14                       Results for orders placed during the hospital encounter of 12/31/22    Adult Transthoracic Echo Complete W/ Cont if Necessary Per Protocol    Interpretation Summary  •  Very technically difficult study with limited views.  •  Left ventricular ejection fraction appears to be 61 - 65%.  •  Left ventricular diastolic function is consistent with (grade I) impaired relaxation.  •  Valves not well visualized but no significant valvular disease by Doppler.      Labs Pending at Discharge:  Pending Labs     Order Current Status    Fungus Culture - Lavage, Lung, Left Lower Lobe In process    Fungus Culture - Wash, Bronchus In process    AFB Culture - Lavage, Lung, Left Lower Lobe Preliminary result    AFB Culture - Wash, Bronchus Preliminary result            Time spent on Discharge including face to face service: 45 minutes

## 2023-01-06 NOTE — PROGRESS NOTES
Pulmonary / Critical Care Progress Note      Patient Name: Delvis Boston  : 1965  MRN: [id number]  Primary Care Physician:  Margie Garcia APRN  Date of admission: 2022    Subjective   Subjective   Follow-up for COPD exacerbation, left lower lobe lung nodule, respiratory failure.     Over past 24 hours: remained on nebs- brovana, pulmicort, IV solu-medrol 40 IV q8 hrs, protonix 40 daily, doxy bid. RT  on board.  Continued on anxiolytics.  Overnight oximetry done.    No acute events overnight.     This morning,   Breathing is better.  Cough and wheezing better.  Still has significant chest tightness  Not able to get any secretions.  No significant chest tightness.  No fever or chills.  No nausea or vomiting.      Review of Systems  General:  Fatigue, No Fever  HEENT: No dysphagia, No Visual Changes, no rhinorrhea  Respiratory:  + cough,+Dyspnea, no phlegm, No Pleuritic Pain, + wheezing, no hemoptysis  Cardiovascular: Denies chest pain, denies palpitations,+RIVERA, No Chest Pressure  Gastrointestinal:  No Abdominal Pain, No Nausea, No Vomiting, No Diarrhea  Genitourinary:  No Dysuria, No Frequency, No Hesitancy  Musculoskeletal: No muscle pain or swelling  Neurologic:  No Confusion, no Dysarthria, No Headaches  Skin:  No Rash, No Open Wounds          Objective   Objective     Vitals:   Temp:  [97.7 °F (36.5 °C)-98.2 °F (36.8 °C)] 97.7 °F (36.5 °C)  Heart Rate:  [] 88  Resp:  [18-22] 20  BP: (147-158)/(80-85) 158/82  Flow (L/min):  [2] 2     Physical Exam   Vital Signs Reviewed   General:  WDWN, Alert, has mild conversational dyspnea.    HEENT:  PERRL, EOMI.  OP, nares clear, no sinus tenderness  Neck:  Supple, no JVD, no thyromegaly  Chest:  good aeration, expiratory wheezing bilaterally, no crackles or rhonchi, tympanic to percussion bilaterally, no work of breathing noted  CV: RRR, no MGR, pulses 2+, equal.  Abd:  Soft, NT, ND, + BS, no HSM  EXT:  no clubbing, no cyanosis, no  edema  Neuro:  A&Ox3, CN grossly intact, no focal deficits.  Skin: No rashes or lesions noted      Result Review    Result Review:  I have personally reviewed the results from the time of this admission to 1/6/2023 07:07 EST and agree with these findings:  [x]  Laboratory  [x]  Microbiology  [x]  Radiology  [x]  EKG/Telemetry   [x]  Cardiology/Vascular   []  Pathology  []  Old records  []  Other:  Most notable findings include:         Lab 01/05/23  1200 01/04/23  0548 01/03/23  0540 01/01/23  0638 12/31/22  1235   WBC 13.48* 16.92* 19.40* 8.13 9.27   HEMOGLOBIN 16.3 15.4 15.3 16.1 15.6   HEMATOCRIT 51.6* 49.9 48.4 50.3 48.2   PLATELETS 165 216 241 191 190   SODIUM 138 139 140 136 136   POTASSIUM 4.9 4.8 4.4 5.2 4.3   CHLORIDE 91* 96* 99 98 98   CO2 41.3* 40.4* 37.4* 27.0 30.8*   BUN 14 13 12 11 9   CREATININE 0.90 0.69* 0.71* 0.83 0.85   GLUCOSE 190* 159* 168* 184* 101*   CALCIUM 9.8 9.5 9.3 9.6 9.3   PHOSPHORUS  --   --  3.3 3.8  --    TOTAL PROTEIN  --   --  6.5  --  7.1   ALBUMIN  --   --  3.5  --  4.0   GLOBULIN  --   --   --   --  3.1     Resp culture negative.     CT Chest Without Contrast Diagnostic    Result Date: 1/1/2023  PROCEDURE: CT CHEST WO CONTRAST DIAGNOSTIC  COMPARISON: [facility], CR, XR CHEST 1 VW, 12/31/2022, 12:45.  [facility], CT, CHEST W/O CONTRAST, 10/08/2014, 12:36.  [facility], CT, ABDOMEN/PELVIS WITH CONTRAST, 9/18/2015, 13:31.  INDICATIONS: Respiratory illness, nondiagnostic xray  TECHNIQUE: CT images were created without the administration of contrast material.   PROTOCOL:   Standard imaging protocol performed    RADIATION:   DLP: 359.7 mGy*cm   Automated exposure control was utilized to minimize radiation dose.  FINDINGS:  Lung window images reveal marked emphysema.  Pleural thickening in the right hemithorax with calcification appear stable.  Scarring in the right lower lobe is unchanged.  1.5 cm ill-defined noncalcified nodule is seen in  the left lower lobe, well seen on series 202, image 99. This is suspicious for bronchogenic malignancy or lung cancer, a small bronchus is seen centrally.  Mediastinal windows reveal no mediastinal or axillary adenopathy.  Extensive coronary artery calcifications are evident.  Mild degenerative spurring is seen in the thoracic spine.       Impression:   CT scan of the chest with IV contrast demonstrating marked emphysema.  1.5 cm mass in the left lower lobe is suspicious for bronchogenic malignancy or lung cancer.     DANETTE KINCAID MD       Electronically Signed and Approved By: DANETTE KINCAID MD on 1/01/2023 at 9:42                 Assessment & Plan   Assessment / Plan     Active Hospital Problems:  Active Hospital Problems    Diagnosis    • **Acute exacerbation of chronic obstructive pulmonary disease (COPD) (HCC)    • COPD exacerbation (HCC)    • Hypoxia          Impression:  COPD with acute exacerbation  Left lower lobe pulmonary nodule  History of smoking in past    Plan:   CT chest with severe emphysema, left lower lobe pulmonary nodule  Continue Brovana and Pulmicort, DuoNeb.  Switch to oral prednisone.  Will need prednisone taper over 15 days  Continue hydroxyzine and alprazolam for anxiety.  Clinically not improved with aggressive treatment medically.  Status post bronchoscopy.  Currently  growing haemophilus influenza, parainfluenza  and human metapneumovirus.  Continue with Augmentin.  RT  is consulted.  Will need PFT, alpha-1 antitrypsin level and genetics as an outpatient  He will need NIPPV for home likely.  Likely will need oxygen with sleep  Wean oxygen as tolerated.  Overnight oximetry with no significant desaturations.  Will likely still need oxygen with activities.    DVT prophylaxis:  Medical DVT prophylaxis orders are present.    CODE STATUS:   Code Status (Patient has no pulse and is not breathing): CPR (Attempt to Resuscitate)  Medical Interventions (Patient has pulse or is  breathing): Full Support    I personally reviewed pertinent labs, imaging and provider notes. Discussed with bedside nurse and will discuss with primary service.     Electronically signed by Ja Joyner MD, 1/6/2023, 07:07 EST.

## 2023-01-06 NOTE — CASE MANAGEMENT/SOCIAL WORK
RT CHUCK reviewed results of overnight pulse oximetry performed on 2L nasal cannula.  Mr Boston had 8 desaturation events totaling 293 seconds.  The deepest desaturation value was 84%.

## 2023-01-06 NOTE — NURSING NOTE
Exercise Oximetry    Patient Name:Delvis Boston   MRN: [id number]   Date: 01/06/23             ROOM AIR BASELINE   SpO2% 87   Heart Rate 93   Blood Pressure      EXERCISE ON ROOM AIR SpO2% EXERCISE ON O2 @ 5 LPM SpO2%   1 MINUTE  87 1 MINUTES  84(on 2L)   2 MINUTES  2 MINUTES  88 (on 5L)   3 MINUTES  3 MINUTES 90   4 MINUTES  4 MINUTES 89   5 MINUTES  5 MINUTES 91   6 MINUTES  6 MINUTES 92              Distance Walked  1100 feet Distance Walked   Dyspnea (Gilma Scale)  Dyspnea (Gilma Scale)   Fatigue (Gilma Scale)   Fatigue (Gilma Scale)   SpO2% Post Exercise  93 SpO2% Post Exercise   HR Post Exercise  103 HR Post Exercise   Time to Recovery  1 min Time to Recovery     Comments: Pt O2 sat dropped to 87 on room air sitting. Pt returned to 2L Pt satted 91% while sitting with 2L. Pt began walk test on 2L. Pt sat droped to 83 on 2L. Oxtgen slowly titrated up until sat raised above 88%. Sat reached 88% with 5L and remained at or above that for rest of test.

## 2023-01-06 NOTE — SIGNIFICANT NOTE
01/06/23 1230   Coping/Psychosocial   Observed Emotional State calm;cooperative   Verbalized Emotional State hopefulness;relief   Trust Relationship/Rapport empathic listening provided   Family/Support Persons spouse   Involvement in Care interacting with patient   Additional Documentation Spiritual Care (Group)   Spiritual Care   Use of Spiritual Resources non-P[employer]ec[employer]al use of spiritual care   Spiritual Care Source  initiative   Spiritual Care Follow-Up follow-up, none required as presently assessed   Response to Spiritual Care engaged in conversation;receptive of support;thanks expressed   Spiritual Care Interventions supportive conversation provided   Spiritual Care Visit Type initial   Receptivity to Spiritual Care visit welcomed

## 2023-01-06 NOTE — CASE MANAGEMENT/SOCIAL WORK
Arformoterol: Key: [id number] Available without authorization  Budesonide:  Key: [id number]- Available without authorization    RT CM submitted PA for above meds.      COPD action plan and home oxygen use reviewed with Mr Boston and his wife.

## 2023-01-06 NOTE — PLAN OF CARE
Goal Outcome Evaluation:  Plan of Care Reviewed With: patient        Progress: no change  Outcome Evaluation: PRN atarax given for anxiety. Patient reports getting some sleep. Overnight pulse ox study done. Patient short of air at times, but no saturations remain above 90% on 2LNC

## 2023-01-06 NOTE — PLAN OF CARE
Problem: Adult Inpatient Plan of Care  Goal: Plan of Care Review  Outcome: Met  Goal: Patient-Specific Goal (Individualized)  Outcome: Met  Goal: Absence of Hospital-Acquired Illness or Injury  Outcome: Met  Intervention: Identify and Manage Fall Risk  Recent Flowsheet Documentation  Taken 1/6/2023 1300 by Terri Nava RNA  Safety Promotion/Fall Prevention: safety round/check completed  Taken 1/6/2023 1111 by eTrri Nava RNA  Safety Promotion/Fall Prevention: safety round/check completed  Taken 1/6/2023 0810 by Terri Nava RNA  Safety Promotion/Fall Prevention: safety round/check completed  Taken 1/6/2023 0715 by Terri Nava RNA  Safety Promotion/Fall Prevention: safety round/check completed  Intervention: Prevent and Manage VTE (Venous Thromboembolism) Risk  Recent Flowsheet Documentation  Taken 1/6/2023 0810 by Terri Nava RNA  Activity Management: up ad annette  Range of Motion: active ROM (range of motion) encouraged  Goal: Optimal Comfort and Wellbeing  Outcome: Met  Intervention: Provide Person-Centered Care  Recent Flowsheet Documentation  Taken 1/6/2023 0810 by Terri Nava RNA  Trust Relationship/Rapport:   care explained   choices provided   emotional support provided   empathic listening provided   questions answered   questions encouraged   reassurance provided   thoughts/feelings acknowledged  Goal: Readiness for Transition of Care  Outcome: Met     Problem: Behavioral Health Comorbidity  Goal: Maintenance of Behavioral Health Symptom Control  Outcome: Met  Intervention: Maintain Behavioral Health Symptom Control  Recent Flowsheet Documentation  Taken 1/6/2023 0810 by Terri Nava RNA  Medication Review/Management: medications reviewed     Problem: COPD (Chronic Obstructive Pulmonary Disease) Comorbidity  Goal: Maintenance of COPD Symptom Control  Outcome: Met  Intervention: Maintain COPD-Symptom Control  Recent Flowsheet Documentation  Taken 1/6/2023 0810 by Terri Nava  RNA  Medication Review/Management: medications reviewed     Problem: Hypertension Comorbidity  Goal: Blood Pressure in Desired Range  Outcome: Met  Intervention: Maintain Blood Pressure Management  Recent Flowsheet Documentation  Taken 1/6/2023 0810 by Terri Nava RNA  Medication Review/Management: medications reviewed   Goal Outcome Evaluation:

## 2023-01-07 ENCOUNTER — READMISSION MANAGEMENT (OUTPATIENT)
Dept: CALL CENTER | Facility: HOSPITAL | Age: 58
End: 2023-01-07
Payer: MEDICARE

## 2023-01-07 NOTE — OUTREACH NOTE
Prep Survey    Flowsheet Row Responses   [facility] patient discharged from? [facility]   Is LACE score < 7 ? No   Eligibility Readm Mgmt   Discharge diagnosis COPD with acute exacerbation, Acute hypoxemic respiratory failure, left lower lobe nodule    Does the patient have one of the following disease processes/diagnoses(primary or secondary)? COPD   Does the patient have Home health ordered? No   Is there a DME ordered? Yes   What DME was ordered? [employer] [employer]   Prep survey completed? Yes          GUERA CHAPA - Registered Nurse

## 2023-01-10 ENCOUNTER — READMISSION MANAGEMENT (OUTPATIENT)
Dept: CALL CENTER | Facility: HOSPITAL | Age: 58
End: 2023-01-10
Payer: MEDICARE

## 2023-01-10 NOTE — OUTREACH NOTE
COPD/PN Week 1 Survey    Flowsheet Row Responses   [facility] patient discharged from? [facility]   Does the patient have one of the following disease processes/diagnoses(primary or secondary)? COPD   Week 1 attempt successful? Yes   Call start time 0927   Call end time 0937   Discharge diagnosis COPD with acute exacerbation, Acute hypoxemic respiratory failure, left lower lobe nodule    Person spoke with today (if not patient) and relationship Patient   Meds reviewed with patient/caregiver? Yes   Is the patient having any side effects they believe may be caused by any medication additions or changes? No   Does the patient have all medications ordered at discharge? Yes   Is the patient taking all medications as directed (includes completed medication regime)? Yes   Does the patient have a primary care provider?  Yes   Does the patient have an appointment with their PCP or specialist within 7 days of discharge? Yes   Comments regarding PCP 1/12/23   Has the patient kept scheduled appointments due by today? N/A   Pulse Ox monitoring None   Psychosocial issues? No   Did the patient receive a copy of their discharge instructions? Yes   Nursing interventions Reviewed instructions with patient   What is the patient's perception of their health status since discharge? Improving   Nursing Interventions Nurse provided patient education   Is the patient/caregiver able to teach back the hierarchy of who to call/visit for symptoms/problems? PCP, Specialist, Home health nurse, Urgent Care, ED, 911 Yes   Is the patient able to teach back COPD zones? Yes   Nursing interventions Education provided on various zones   Patient reports what zone on this call? Green Zone   Green Zone Breathing without shortness of breath, Reports doing well   Green Zone interventions: Take daily medications, Use oxygen as prescribed, At all times avoid cigarette smoking, vaping and inhaled irritants   Week 1 call completed? Yes   Wrap up additional  comments Pt states he is having less SOB as he is wearing cont 5LO2. Reviewed AVS/medicaitons with pt. Pt verified PCP fu appt on 1/12/23. Pt advised to ask PCP about getting a nebulizer as pt is using another's nebulizer. Pt is anticipating results from left lung bx. Pt has a Pulmonary fu appt.          BRENNA REVELES - Registered Nurse

## 2023-01-13 ENCOUNTER — TELEPHONE (OUTPATIENT)
Dept: PULMONOLOGY | Facility: CLINIC | Age: 58
End: 2023-01-13
Payer: MEDICARE

## 2023-01-13 NOTE — TELEPHONE ENCOUNTER
[facility] contacted us and stated pt neb meds was sent to them;however pt insurance is out of network.   Pt neb meds needs to be sent to pt pharmacy.

## 2023-01-20 ENCOUNTER — OFFICE VISIT (OUTPATIENT)
Dept: PULMONOLOGY | Facility: CLINIC | Age: 58
End: 2023-01-20
Payer: MEDICARE

## 2023-01-20 VITALS
TEMPERATURE: 97.5 F | DIASTOLIC BLOOD PRESSURE: 80 MMHG | RESPIRATION RATE: 18 BRPM | HEART RATE: 92 BPM | BODY MASS INDEX: 29.82 KG/M2 | WEIGHT: 190 LBS | SYSTOLIC BLOOD PRESSURE: 153 MMHG | HEIGHT: 67 IN

## 2023-01-20 DIAGNOSIS — R06.09 DYSPNEA ON EXERTION: ICD-10-CM

## 2023-01-20 DIAGNOSIS — Z23 ENCOUNTER FOR IMMUNIZATION: ICD-10-CM

## 2023-01-20 DIAGNOSIS — G47.33 OSA (OBSTRUCTIVE SLEEP APNEA): ICD-10-CM

## 2023-01-20 DIAGNOSIS — J43.9 PULMONARY EMPHYSEMA, UNSPECIFIED EMPHYSEMA TYPE: ICD-10-CM

## 2023-01-20 DIAGNOSIS — J96.01 ACUTE RESPIRATORY FAILURE WITH HYPOXIA: ICD-10-CM

## 2023-01-20 DIAGNOSIS — T17.500A MUCUS PLUGGING OF BRONCHI: ICD-10-CM

## 2023-01-20 DIAGNOSIS — F17.211 NICOTINE DEPENDENCE, CIGARETTES, IN REMISSION: ICD-10-CM

## 2023-01-20 DIAGNOSIS — J44.9 CHRONIC OBSTRUCTIVE PULMONARY DISEASE, UNSPECIFIED COPD TYPE: Primary | ICD-10-CM

## 2023-01-20 DIAGNOSIS — R91.8 LUNG MASS: ICD-10-CM

## 2023-01-20 DIAGNOSIS — R05.3 CHRONIC COUGH: ICD-10-CM

## 2023-01-20 DIAGNOSIS — R06.89 AIRWAY CLEARANCE IMPAIRMENT: ICD-10-CM

## 2023-01-20 PROCEDURE — G0009 ADMIN PNEUMOCOCCAL VACCINE: HCPCS | Performed by: NURSE PRACTITIONER

## 2023-01-20 PROCEDURE — 94618 PULMONARY STRESS TESTING: CPT | Performed by: NURSE PRACTITIONER

## 2023-01-20 PROCEDURE — 1111F DSCHRG MED/CURRENT MED MERGE: CPT | Performed by: NURSE PRACTITIONER

## 2023-01-20 PROCEDURE — 99215 OFFICE O/P EST HI 40 MIN: CPT | Performed by: NURSE PRACTITIONER

## 2023-01-20 PROCEDURE — 90677 PCV20 VACCINE IM: CPT | Performed by: NURSE PRACTITIONER

## 2023-01-20 RX ORDER — HYDROXYZINE HYDROCHLORIDE 25 MG/1
TABLET, FILM COATED ORAL
COMMUNITY
Start: 2023-01-12

## 2023-01-20 RX ORDER — AMLODIPINE BESYLATE 5 MG/1
TABLET ORAL
COMMUNITY
Start: 2022-11-16

## 2023-01-20 RX ORDER — DOXYCYCLINE HYCLATE 100 MG
TABLET ORAL
COMMUNITY
Start: 2022-11-22 | End: 2023-01-20

## 2023-01-20 RX ORDER — AZITHROMYCIN 250 MG/1
TABLET, FILM COATED ORAL
COMMUNITY
Start: 2022-11-17 | End: 2023-01-20

## 2023-01-20 NOTE — PROGRESS NOTES
Primary Care Provider  Margie Garcia APRN     Referring Provider  No ref. provider found     Chief Complaint  Shortness of Breath, Hospital Follow Up Visit (COPD/Hypoxia ), and Pneumonia    Subjective          Delvis Boston presents to [facility]  History of Present Illness  Delvis Boston is a 57 y.o. male patient here for hospital follow-up.      He was admitted to [facility] for COPD with acute exacerbation, pneumonia on 12/31/2022 and discharged home 1/6/2023.  His hospital diagnoses included COPD with acute exacerbation, hypoxemic respiratory failure, Haemophilus influenzae pneumonia, metapneumovirus pneumonia, parainfluenza pneumonia, bilateral mucous plugging, mild diastolic dysfunction with preserved EF, likely obstructive sleep apnea.  A 1.5 cm left lower lobe nodule was also seen on hospital CT chest 1/1/2023.  His past medical history includes hypertension, hypothyroidism, workplace respiratory exposures, recurrent pneumothoraces, history of tobacco use.  Patient underwent bronchoscopy with Dr. Joyner 1/4/2023, no growth of any BAL cultures at 2 weeks.  Patient discharged home on 2 L oxygen via nasal cannula.  New medications prescribed at discharge were Augmentin, Prednisone (15 day taper), DuoNeb, Pulmicort, and Brovana nebs.      He is also wearing oxygen most of the time at home now, was not on oxygen prior to hospitalization.  Today, patient came into the clinic on room air and was found to be hypoxic SpO2 88% and he was put on 2 L nasal cannula.  His main complaint is tiredness, being more short of air than he was prior to hospitalization.  He reports he has been using his nebulizer treatments only once a day rather than twice due to them being time-consuming.  Strongly encourage patient to use his nebulizers as scheduled twice a day.  We also went over his BAL and washing results from bronchoscopy.  All patient's cytology  is negative for any malignant cells, we will continue to follow this lung nodule.  We will order repeat CT scan in 3 months.  Patient is a former smoker and has a 25-pack-year history.  Patient did quit in 2008.  Patient denies ever being diagnosed with COPD but does have a family history of emphysema and COPD.  He denies any significant occupational exposure to chemicals.  He is able to perform his ADLs with minor modifications currently.  He like to receive his pneumonia vaccine today in office.  Our office does not currently have flu vaccines.    Scribed for Anette Valladares NP by Daysi Burrows NP on 1/20/2023 at 1044     His history of smoking is   Tobacco Use: Medium Risk   • Smoking Tobacco Use: Former   • Smokeless Tobacco Use: Never   • Passive Exposure: Not on file   .    Review of Systems   Constitutional: Positive for fatigue. Negative for activity change, chills, fever, unexpected weight gain and unexpected weight loss.   HENT: Negative for congestion, ear discharge, ear pain, mouth sores, postnasal drip, rhinorrhea, sinus pressure, sore throat, swollen glands and trouble swallowing.    Eyes: Negative for blurred vision, pain, discharge, itching and visual disturbance.   Respiratory: Positive for shortness of breath. Negative for apnea, cough, chest tightness, wheezing and stridor.    Cardiovascular: Negative for chest pain, palpitations and leg swelling.   Gastrointestinal: Negative for abdominal distention, abdominal pain, constipation, diarrhea, nausea, vomiting, GERD and indigestion.   Musculoskeletal: Negative for arthralgias, joint swelling and myalgias.   Skin: Negative for color change.   Neurological: Negative for dizziness, weakness, light-headedness and headache.      Sleep: Positive for Excessive daytime sleepiness  Negative for morning headaches  Positive for Snoring    History reviewed. No pertinent family history.     Social History     Socioeconomic History   • Marital status:     Tobacco Use   • Smoking status: Former     Packs/day: 1.00     Years: 25.00     Pack years: 25.00     Types: Cigarettes     Quit date: 01/2008     Years since quitting: 15.0   • Smokeless tobacco: Never   Vaping Use   • Vaping Use: Former   Substance and Sexual Activity   • Alcohol use: Never   • Drug use: Never        History reviewed. No pertinent past medical history.     Immunization History   Administered Date(s) Administered   • Pneumococcal Conjugate 20-Valent (PCV20) 01/20/2023         No Known Allergies       Current Outpatient Medications:   •  amLODIPine (NORVASC) 5 MG tablet, , Disp: , Rfl:   •  arformoterol (BROVANA) 15 MCG/2ML nebulizer solution, Take 2 mL by nebulization 2 (Two) Times a Day for 30 days., Disp: 120 mL, Rfl: 0  •  budesonide (PULMICORT) 0.5 MG/2ML nebulizer solution, Take 2 mL by nebulization 2 (Two) Times a Day for 30 days., Disp: 120 mL, Rfl: 0  •  fluticasone (FLONASE) 50 MCG/ACT nasal spray, 2 sprays into the nostril(s) as directed by provider Daily., Disp: 11.1 mL, Rfl: 0  •  hydrOXYzine (ATARAX) 25 MG tablet, , Disp: , Rfl:   •  ipratropium-albuterol (COMBIVENT RESPIMAT)  MCG/ACT inhaler, Inhale 1 puff 4 (Four) Times a Day As Needed for Wheezing., Disp: , Rfl:   •  ipratropium-albuterol (DUO-NEB) 0.5-2.5 mg/3 ml nebulizer, Take 3 mL (1 vial) by nebulization 4 (Four) Times a Day for 15 days. Take four times a day for now and can decrease to as needed once your symptoms start to improve, Disp: 180 mL, Rfl: 0  •  levothyroxine (SYNTHROID, LEVOTHROID) 100 MCG tablet, Take 100 mcg by mouth Daily., Disp: , Rfl:   •  lisinopril (PRINIVIL,ZESTRIL) 20 MG tablet, Take 20 mg by mouth Daily., Disp: , Rfl:   •  sodium chloride (Ocean Nasal Spray) 0.65 % nasal spray, 1 spray into the nostril(s) as directed by provider As Needed for Congestion., Disp: 30 mL, Rfl: 0     Objective   Physical Exam  Constitutional:       General: He is not in acute distress.     Appearance: Normal  "appearance. He is normal weight. He is not ill-appearing.   HENT:      Right Ear: Tympanic membrane and ear canal normal.      Left Ear: Tympanic membrane and ear canal normal.      Nose: Nose normal.      Mouth/Throat:      Mouth: Mucous membranes are moist.      Pharynx: Oropharynx is clear.   Eyes:      Extraocular Movements: Extraocular movements intact.      Conjunctiva/sclera: Conjunctivae normal.      Pupils: Pupils are equal, round, and reactive to light.   Cardiovascular:      Rate and Rhythm: Normal rate and regular rhythm.      Pulses: Normal pulses.      Heart sounds: Normal heart sounds.   Pulmonary:      Effort: Pulmonary effort is normal. No respiratory distress.      Breath sounds: No stridor. Wheezing present. No rhonchi or rales.   Abdominal:      General: Bowel sounds are normal.      Palpations: Abdomen is soft.   Musculoskeletal:         General: No swelling. Normal range of motion.      Cervical back: Normal range of motion and neck supple.      Right lower leg: No edema.      Left lower leg: No edema.   Skin:     General: Skin is warm and dry.   Neurological:      General: No focal deficit present.      Mental Status: He is alert and oriented to person, place, and time.      Motor: No weakness.   Psychiatric:         Mood and Affect: Mood normal.         Behavior: Behavior normal.         Vital Signs:   /80 (BP Location: Left arm, Patient Position: Sitting, Cuff Size: Adult)   Pulse 92   Temp 97.5 °F (36.4 °C) (Temporal)   Resp 18   Ht 170.2 cm (67\")   Wt 86.2 kg (190 lb)   BMI 29.76 kg/m²        Result Review :     CMP    CMP 1/3/23 1/3/23 1/4/23 1/5/23    0540 0540     Glucose  168 (A) 159 (A) 190 (A)   BUN  12 13 14   Creatinine  0.71 (A) 0.69 (A) 0.90   eGFR  105.7 106.6 98.4   Sodium  140 139 138   Potassium  4.4 4.8 4.9   Chloride  99 96 (A) 91 (A)   Calcium  9.3 9.5 9.8   Total Protein 6.5      Albumin 3.5      Total Bilirubin <0.2      Alkaline Phosphatase 75      AST " (SGOT) 13      ALT (SGPT) 17      BUN/Creatinine Ratio  16.9 18.8 15.6   Anion Gap  3.6 (A) 2.6 (A) 5.7   (A) Abnormal value       Comments are available for some flowsheets but are not being displayed.           CBC w/diff    CBC w/Diff 1/3/23 1/4/23 1/5/23   WBC 19.40 (A) 16.92 (A) 13.48 (A)   RBC 5.31 5.44 5.70   Hemoglobin 15.3 15.4 16.3   Hematocrit 48.4 49.9 51.6 (A)   MCV 91.1 91.7 90.5   MCH 28.8 28.3 28.6   MCHC 31.6 30.9 (A) 31.6   RDW 13.4 13.2 12.8   Platelets 241 216 165   Neutrophil Rel % 86.1 (A) 82.0 (A) 81.3 (A)   Immature Granulocyte Rel % 1.1 (A) 1.7 (A) 2.6 (A)   Lymphocyte Rel % 7.4 (A) 9.3 (A) 8.0 (A)   Monocyte Rel % 5.2 6.3 7.3   Eosinophil Rel % 0.0 (A) 0.0 (A) 0.1 (A)   Basophil Rel % 0.2 0.7 0.7   (A) Abnormal value          Personally reviewed respiratory culture, AFB culture, fungus culture, bronchoscopy wash, pneumonia panel, BAL culture and cytology from 1/4/2023    Data reviewed: Radiologic studies Including CT chest without contrast from 1/1/2023 showing 1.5 cm left lower lobe nodule and emphysema, chest x-ray 12/31/2022, chest x-ray 1/4/2023, Consultant notes Dr. Green's consultation note 1/1/2023 and Recent hospitalization notes Hospital discharge summary 1/6/2023        Assessment and Plan    Diagnoses and all orders for this visit:    1. Chronic obstructive pulmonary disease, unspecified COPD type (HCC) (Primary)  -     Alpha - 1 - Antitrypsin; Future  -     6 Minute Walk Test; Future  -     CT Chest Without Contrast; Future    2. Lung mass  -     Alpha - 1 - Antitrypsin; Future  -     6 Minute Walk Test; Future  -     CT Chest Without Contrast; Future    3. JUSTICE (obstructive sleep apnea)  -     Alpha - 1 - Antitrypsin; Future  -     6 Minute Walk Test; Future  -     CT Chest Without Contrast; Future    4. Nicotine dependence, cigarettes, in remission    5. Dyspnea on exertion    6. Acute respiratory failure with hypoxia (HCC)    7. Pulmonary emphysema, unspecified emphysema  type (HCC)    8. Mucus plugging of bronchi    9. Airway clearance impairment    10. Chronic cough    11. Encounter for immunization  -     Pneumococcal Conjugate Vaccine 20-Valent (PCV20)    12.  Continue using nebulizer treatments, encourage patient to use Brovana and Pulmicort twice daily as he is a little wheezy today.  Okay to mix Brovana and Pulmicort, use DuoNeb treatments separately as needed.  Instructed him to rinse mouth after each use  13.  Continue carrying Combivent inhaler for use as needed  14.  We will order follow-up CT chest in 3 months to monitor 1.5 cm left lower lobe nodule  15.  We will follow-up on results of alpha-1 antitrypsin, patient reports multiple family members with emphysema and COPD  16.  PFTs scheduled for next week.  Strongly encourage patient to keep this appointment.  17.  Sleep medicine referral scheduled for 4/21/2023  18.  Patient reports he is up-to-date with his Covid vaccines.  PCV 20 given in office today.  Unfortunately, we do not have flu vaccines in office today.  19.  6-minute walk performed in office today.  Patient still qualifies for oxygen as he is 88% on room air while walking to the exam room.  2 L of oxygen placed and patient does rebound to the mid 90s.  20.   Follow-up with Dr. Sheehan scheduled for 4/19/2023, after CT scan or sooner if needed    I spent 45 minutes caring for Delvis on this date of service. This time includes time spent by me in the following activities:preparing for the visit, reviewing tests, obtaining and/or reviewing a separately obtained history, performing a medically appropriate examination and/or evaluation , counseling and educating the patient/family/caregiver, ordering medications, tests, or procedures and documenting information in the medical record    Follow Up   Return in about 3 months (around 4/20/2023) for CT follow-up with Dr. Sheehan.  Patient was given instructions and counseling regarding his condition or for health  maintenance advice. Please see specific information pulled into the AVS if appropriate.

## 2023-01-20 NOTE — PATIENT INSTRUCTIONS
COPD and Physical Activity  Chronic obstructive pulmonary disease (COPD) is a long-term, or chronic, condition that affects the lungs. COPD is a general term that can be used to describe many problems that cause inflammation of the lungs and limit airflow. These conditions include chronic bronchitis and emphysema.  The main symptom of COPD is shortness of breath, which makes it harder to do even simple tasks. This can also make it harder to exercise and stay active. Talk with your health care provider about treatments to help you breathe better and actions you can take to prevent breathing problems during physical activity.  What are the benefits of exercising when you have COPD?  Exercising regularly is an important part of a healthy lifestyle. You can still exercise and do physical activities even though you have COPD. Exercise and physical activity improve your shortness of breath by increasing blood flow (circulation). This causes your heart to pump more oxygen through your body. Moderate exercise can:  Improve oxygen use.  Increase your energy level.  Help with shortness of breath.  Strengthen your breathing muscles.  Improve heart health.  Help with sleep.  Improve your self-esteem and feelings of self-worth.  Lower depression, stress, and anxiety.  Exercise can benefit everyone with COPD. The severity of your disease may affect how hard you can exercise, especially at first, but everyone can benefit. Talk with your health care provider about how much exercise is safe for you, and which activities and exercises are safe for you.  What actions can I take to prevent breathing problems during physical activity?  Sign up for a pulmonary rehabilitation program. This type of program may include:  Education about lung diseases.  Exercise classes that teach you how to exercise and be more active while improving your breathing. This usually involves:  Exercise using your lower extremities, such as a stationary  bicycle.  About 30 minutes of exercise, 2 to 5 times per week, for 6 to 12 weeks.  Strength training, such as push-ups or leg lifts.  Nutrition education.  Group classes in which you can talk with others who also have COPD and learn ways to manage stress.  If you use an oxygen tank, you should use it while you exercise. Work with your health care provider to adjust your oxygen for your physical activity. Your resting flow rate is different from your flow rate during physical activity.  How to manage your breathing while exercising  While you are exercising:  Take slow breaths.  Pace yourself, and do nottry to go too fast.  Purse your lips while breathing out. Pursing your lips is similar to a kissing or whistling position.  If doing exercise that uses a quick burst of effort, such as weight lifting:  Breathe in before starting the exercise.  Breathe out during the hardest part of the exercise, such as raising the weights.  Where to find support  You can find support for exercising with COPD from:  Your health care provider.  A pulmonary rehabilitation program.  Your local health department or community health programs.  Support groups, either online or in-person. Your health care provider may be able to recommend support groups.  Where to find more information  You can find more information about exercising with COPD from:  American Lung Association: lung.org  COPD Foundation: copdfoundation.org  Contact a health care provider if:  Your symptoms get worse.  You have nausea.  You have a fever.  You want to start a new exercise program or a new activity.  Get help right away if:  You have chest pain.  You cannot breathe.  These symptoms may represent a serious problem that is an emergency. Do not wait to see if the symptoms will go away. Get medical help right away. Call your local emergency services (911 in the [place] Do not drive yourself to the hospital.  Summary  COPD is a general term that can be used to describe  many different lung problems that cause lung inflammation and limit airflow. This includes chronic bronchitis and emphysema.  Exercise and physical activity improve your shortness of breath by increasing blood flow (circulation). This causes your heart to provide more oxygen to your body.  Contact your health care provider before starting any exercise program or new activity. Ask your health care provider what exercises and activities are safe for you.  This information is not intended to replace advice given to you by your health care provider. Make sure you discuss any questions you have with your health care provider.  Document Revised: 10/26/2021 Document Reviewed: 10/26/2021  [employer] Patient Education © 2022 [employer]

## 2023-02-01 LAB
FUNGUS WND CULT: NORMAL
FUNGUS WND CULT: NORMAL

## 2023-02-15 LAB
MYCOBACTERIUM SPEC CULT: NORMAL
MYCOBACTERIUM SPEC CULT: NORMAL
NIGHT BLUE STAIN TISS: NORMAL

## 2023-02-20 ENCOUNTER — PROCEDURE VISIT (OUTPATIENT)
Dept: CARDIAC REHAB | Facility: HOSPITAL | Age: 58
End: 2023-02-20
Payer: MEDICARE

## 2023-02-20 DIAGNOSIS — G47.33 OSA (OBSTRUCTIVE SLEEP APNEA): ICD-10-CM

## 2023-02-20 DIAGNOSIS — J44.9 CHRONIC OBSTRUCTIVE PULMONARY DISEASE, UNSPECIFIED COPD TYPE: ICD-10-CM

## 2023-02-20 DIAGNOSIS — R91.8 LUNG MASS: ICD-10-CM

## 2023-02-20 PROCEDURE — 94618 PULMONARY STRESS TESTING: CPT

## 2023-04-20 ENCOUNTER — HOSPITAL ENCOUNTER (OUTPATIENT)
Dept: CT IMAGING | Facility: HOSPITAL | Age: 58
Discharge: HOME | End: 2023-04-20
Payer: MEDICARE

## 2023-04-20 DIAGNOSIS — R91.8 LUNG MASS: ICD-10-CM

## 2023-04-20 DIAGNOSIS — R91.1 LUNG NODULE SEEN ON IMAGING STUDY: Primary | ICD-10-CM

## 2023-04-20 DIAGNOSIS — G47.33 OSA (OBSTRUCTIVE SLEEP APNEA): ICD-10-CM

## 2023-04-20 DIAGNOSIS — J44.9 CHRONIC OBSTRUCTIVE PULMONARY DISEASE, UNSPECIFIED COPD TYPE: ICD-10-CM

## 2023-04-20 PROCEDURE — 71250 CT THORAX DX C-: CPT

## (undated) DEVICE — LINER SURG CANSTR SXN S/RIGD 1500CC

## (undated) DEVICE — Device

## (undated) DEVICE — SINGLE USE BIOPSY VALVE MAJ-210: Brand: SINGLE USE BIOPSY VALVE (STERILE)

## (undated) DEVICE — DEV ATOMIZATION MUCOSAL/NASALTRACH

## (undated) DEVICE — SINGLE USE SUCTION VALVE MAJ-209: Brand: SINGLE USE SUCTION VALVE (STERILE)

## (undated) DEVICE — CUP SPECI 4OZ LF STRL

## (undated) DEVICE — SOLIDIFIER LIQLOC PLS 1500CC BT

## (undated) DEVICE — BLCK/BITE BLOX WO/DENTL/RIM W/STRAP 54F